# Patient Record
Sex: FEMALE | Race: WHITE | NOT HISPANIC OR LATINO | Employment: FULL TIME | URBAN - METROPOLITAN AREA
[De-identification: names, ages, dates, MRNs, and addresses within clinical notes are randomized per-mention and may not be internally consistent; named-entity substitution may affect disease eponyms.]

---

## 2023-07-15 LAB
CHLAMYDIA TRACH., AMPLIFIED: NEGATIVE
N. GONORRHEA, AMPLIFIED: NEGATIVE

## 2023-07-16 LAB — URINE CULTURE: NO GROWTH

## 2023-07-21 LAB
ERYTHROCYTE DISTRIBUTION WIDTH (RATIO) BY AUTOMATED COUNT: 14.7 % (ref 11.5–14.5)
ERYTHROCYTE MEAN CORPUSCULAR HEMOGLOBIN CONCENTRATION (G/DL) BY AUTOMATED: 32 G/DL (ref 32–36)
ERYTHROCYTE MEAN CORPUSCULAR VOLUME (FL) BY AUTOMATED COUNT: 89 FL (ref 80–100)
ERYTHROCYTES (10*6/UL) IN BLOOD BY AUTOMATED COUNT: 4.55 X10E12/L (ref 4–5.2)
HEMATOCRIT (%) IN BLOOD BY AUTOMATED COUNT: 40.6 % (ref 36–46)
HEMOGLOBIN (G/DL) IN BLOOD: 13 G/DL (ref 12–16)
LEUKOCYTES (10*3/UL) IN BLOOD BY AUTOMATED COUNT: 8.3 X10E9/L (ref 4.4–11.3)
PLATELETS (10*3/UL) IN BLOOD AUTOMATED COUNT: 265 X10E9/L (ref 150–450)
REFLEX ADDED, ANEMIA PANEL: ABNORMAL

## 2023-07-22 LAB
ABO GROUP (TYPE) IN BLOOD: NORMAL
ANTIBODY SCREEN: NORMAL
ESTIMATED AVERAGE GLUCOSE FOR HBA1C: 108 MG/DL
HEMOGLOBIN A1C/HEMOGLOBIN TOTAL IN BLOOD: 5.4 %
HEPATITIS B VIRUS SURFACE AG PRESENCE IN SERUM: NONREACTIVE
HEPATITIS C VIRUS AB PRESENCE IN SERUM: NONREACTIVE
HIV 1/ 2 AG/AB SCREEN: NONREACTIVE
RH FACTOR: NORMAL
RUBELLA VIRUS IGG AB: POSITIVE
SYPHILIS TOTAL AB: NONREACTIVE

## 2023-07-24 LAB
ABO GROUP (TYPE) IN BLOOD: NORMAL
ANTIBODY SCREEN: NORMAL
RH FACTOR: NORMAL

## 2023-09-02 PROBLEM — Z34.90 PRENATAL CARE (HHS-HCC): Status: ACTIVE | Noted: 2023-09-02

## 2023-09-02 RX ORDER — ACETAMINOPHEN 325 MG/1
3 TABLET ORAL EVERY 6 HOURS
COMMUNITY
Start: 2020-08-08 | End: 2023-10-10 | Stop reason: ALTCHOICE

## 2023-10-10 ENCOUNTER — ROUTINE PRENATAL (OUTPATIENT)
Dept: OBSTETRICS AND GYNECOLOGY | Facility: CLINIC | Age: 38
End: 2023-10-10
Payer: COMMERCIAL

## 2023-10-10 VITALS
SYSTOLIC BLOOD PRESSURE: 122 MMHG | DIASTOLIC BLOOD PRESSURE: 80 MMHG | BODY MASS INDEX: 30.16 KG/M2 | WEIGHT: 170.25 LBS

## 2023-10-10 DIAGNOSIS — Z3A.22 22 WEEKS GESTATION OF PREGNANCY (HHS-HCC): Primary | ICD-10-CM

## 2023-10-10 PROBLEM — O09.522 MULTIGRAVIDA OF ADVANCED MATERNAL AGE IN SECOND TRIMESTER (HHS-HCC): Status: ACTIVE | Noted: 2023-10-10

## 2023-10-10 PROBLEM — Z87.59 HISTORY OF SHOULDER DYSTOCIA IN PRIOR PREGNANCY: Status: ACTIVE | Noted: 2023-10-10

## 2023-10-10 PROCEDURE — 0501F PRENATAL FLOW SHEET: CPT | Performed by: OBSTETRICS & GYNECOLOGY

## 2023-10-10 PROCEDURE — 90686 IIV4 VACC NO PRSV 0.5 ML IM: CPT | Performed by: OBSTETRICS & GYNECOLOGY

## 2023-10-10 PROCEDURE — 90471 IMMUNIZATION ADMIN: CPT | Performed by: OBSTETRICS & GYNECOLOGY

## 2023-10-10 NOTE — PROGRESS NOTES
Routine ob at 22.3 wks.  Feeling well.  Normal anatomy scan, has phone number to schedule 30 wk scan.  Flu shot given today.  1 hour n/v.  H/o shoulder dystocia not discussed today, will need another MD visit for this in future.

## 2023-11-08 ENCOUNTER — ROUTINE PRENATAL (OUTPATIENT)
Dept: OBSTETRICS AND GYNECOLOGY | Facility: CLINIC | Age: 38
End: 2023-11-08
Payer: COMMERCIAL

## 2023-11-08 VITALS
DIASTOLIC BLOOD PRESSURE: 72 MMHG | WEIGHT: 174.13 LBS | BODY MASS INDEX: 30.84 KG/M2 | SYSTOLIC BLOOD PRESSURE: 114 MMHG

## 2023-11-08 DIAGNOSIS — Z13.1 SCREENING FOR DIABETES MELLITUS: Primary | ICD-10-CM

## 2023-11-08 DIAGNOSIS — Z3A.26 26 WEEKS GESTATION OF PREGNANCY (HHS-HCC): ICD-10-CM

## 2023-11-08 DIAGNOSIS — O09.522 MULTIGRAVIDA OF ADVANCED MATERNAL AGE IN SECOND TRIMESTER (HHS-HCC): ICD-10-CM

## 2023-11-08 LAB
ERYTHROCYTE [DISTWIDTH] IN BLOOD BY AUTOMATED COUNT: 13.2 % (ref 11.5–14.5)
GLUCOSE 1H P 50 G GLC PO SERPL-MCNC: 111 MG/DL
HCT VFR BLD AUTO: 35.1 % (ref 36–46)
HGB BLD-MCNC: 10.8 G/DL (ref 12–16)
MCH RBC QN AUTO: 27.1 PG (ref 26–34)
MCHC RBC AUTO-ENTMCNC: 30.8 G/DL (ref 32–36)
MCV RBC AUTO: 88 FL (ref 80–100)
NRBC BLD-RTO: 0 /100 WBCS (ref 0–0)
PLATELET # BLD AUTO: 195 X10*3/UL (ref 150–450)
RBC # BLD AUTO: 3.98 X10*6/UL (ref 4–5.2)
WBC # BLD AUTO: 10.3 X10*3/UL (ref 4.4–11.3)

## 2023-11-08 PROCEDURE — 36415 COLL VENOUS BLD VENIPUNCTURE: CPT

## 2023-11-08 PROCEDURE — 85027 COMPLETE CBC AUTOMATED: CPT

## 2023-11-08 PROCEDURE — 82728 ASSAY OF FERRITIN: CPT

## 2023-11-08 PROCEDURE — 82947 ASSAY GLUCOSE BLOOD QUANT: CPT

## 2023-11-08 PROCEDURE — 0501F PRENATAL FLOW SHEET: CPT | Performed by: OBSTETRICS & GYNECOLOGY

## 2023-11-08 PROCEDURE — 83550 IRON BINDING TEST: CPT

## 2023-11-08 PROCEDURE — 82607 VITAMIN B-12: CPT

## 2023-11-08 PROCEDURE — 82746 ASSAY OF FOLIC ACID SERUM: CPT

## 2023-11-09 LAB
FERRITIN SERPL-MCNC: 14 NG/ML
FOLATE SERPL-MCNC: 17.9 NG/ML
IRON SATN MFR SERPL: NORMAL %
IRON SERPL-MCNC: 35 UG/DL
REFLEX ADDED, ANEMIA PANEL: NORMAL
TIBC SERPL-MCNC: NORMAL UG/DL
UIBC SERPL-MCNC: >450 UG/DL
VIT B12 SERPL-MCNC: 279 PG/ML

## 2023-12-08 ENCOUNTER — ANCILLARY PROCEDURE (OUTPATIENT)
Dept: RADIOLOGY | Facility: CLINIC | Age: 38
End: 2023-12-08
Payer: COMMERCIAL

## 2023-12-08 DIAGNOSIS — Z34.90 ENCOUNTER FOR SUPERVISION OF NORMAL PREGNANCY, UNSPECIFIED, UNSPECIFIED TRIMESTER (HHS-HCC): ICD-10-CM

## 2023-12-08 PROCEDURE — 76816 OB US FOLLOW-UP PER FETUS: CPT

## 2023-12-08 PROCEDURE — 76819 FETAL BIOPHYS PROFIL W/O NST: CPT | Performed by: OBSTETRICS & GYNECOLOGY

## 2023-12-08 PROCEDURE — 76819 FETAL BIOPHYS PROFIL W/O NST: CPT

## 2023-12-08 PROCEDURE — 76816 OB US FOLLOW-UP PER FETUS: CPT | Performed by: OBSTETRICS & GYNECOLOGY

## 2023-12-11 ENCOUNTER — ROUTINE PRENATAL (OUTPATIENT)
Dept: OBSTETRICS AND GYNECOLOGY | Facility: CLINIC | Age: 38
End: 2023-12-11
Payer: COMMERCIAL

## 2023-12-11 VITALS — WEIGHT: 174.5 LBS | BODY MASS INDEX: 30.91 KG/M2 | SYSTOLIC BLOOD PRESSURE: 114 MMHG | DIASTOLIC BLOOD PRESSURE: 72 MMHG

## 2023-12-11 DIAGNOSIS — Z3A.31 31 WEEKS GESTATION OF PREGNANCY (HHS-HCC): Primary | ICD-10-CM

## 2023-12-11 PROCEDURE — 0501F PRENATAL FLOW SHEET: CPT | Performed by: OBSTETRICS & GYNECOLOGY

## 2023-12-11 PROCEDURE — 90471 IMMUNIZATION ADMIN: CPT | Performed by: OBSTETRICS & GYNECOLOGY

## 2023-12-11 PROCEDURE — 90715 TDAP VACCINE 7 YRS/> IM: CPT | Performed by: OBSTETRICS & GYNECOLOGY

## 2023-12-11 NOTE — PROGRESS NOTES
Routine ob at 31.2 wks.  Feeling well. Good FM.  Had normal growth scan last week, 60%, breech.  Has repeat scan at 37 wks.  Tdap today.  RTC 2 wks.

## 2024-01-05 ENCOUNTER — ROUTINE PRENATAL (OUTPATIENT)
Dept: OBSTETRICS AND GYNECOLOGY | Facility: CLINIC | Age: 39
End: 2024-01-05
Payer: COMMERCIAL

## 2024-01-05 VITALS
SYSTOLIC BLOOD PRESSURE: 112 MMHG | BODY MASS INDEX: 31.24 KG/M2 | DIASTOLIC BLOOD PRESSURE: 72 MMHG | WEIGHT: 176.38 LBS

## 2024-01-05 DIAGNOSIS — Z87.59 HISTORY OF SHOULDER DYSTOCIA IN PRIOR PREGNANCY: ICD-10-CM

## 2024-01-05 DIAGNOSIS — Z34.93 PRENATAL CARE IN THIRD TRIMESTER (HHS-HCC): Primary | ICD-10-CM

## 2024-01-05 DIAGNOSIS — O09.523 MULTIGRAVIDA OF ADVANCED MATERNAL AGE IN THIRD TRIMESTER (HHS-HCC): ICD-10-CM

## 2024-01-05 PROBLEM — Z3A.22 22 WEEKS GESTATION OF PREGNANCY (HHS-HCC): Status: RESOLVED | Noted: 2023-10-10 | Resolved: 2024-01-05

## 2024-01-05 PROCEDURE — 0501F PRENATAL FLOW SHEET: CPT | Performed by: OBSTETRICS & GYNECOLOGY

## 2024-01-05 NOTE — PROGRESS NOTES
Routine prenatal visit   No complaints today.  Lots of FM.  GBS next visit.  Start NSTs next visit.     Medical Problems       Problem List       Prenatal care    Overview Signed 10/10/2023 12:20 PM by Araceli Sandoval MD     -flu shot given 10/10         Multigravida of advanced maternal age in third trimester    Overview Signed 10/10/2023 12:19 PM by Araceli Sandoval MD     -serial growth  -NSTs at 36 wks  -IOL in 39th week         History of shoulder dystocia in prior pregnancy    Overview Signed 1/5/2024  5:04 PM by Belkys White MD     - second pregnancy   - no issues with delivery of 3rd child  - offered primary c/s and declines              Follow up in 2 week(s).

## 2024-01-19 ENCOUNTER — APPOINTMENT (OUTPATIENT)
Dept: OBSTETRICS AND GYNECOLOGY | Facility: CLINIC | Age: 39
End: 2024-01-19
Payer: COMMERCIAL

## 2024-01-22 ENCOUNTER — ROUTINE PRENATAL (OUTPATIENT)
Dept: OBSTETRICS AND GYNECOLOGY | Facility: CLINIC | Age: 39
End: 2024-01-22
Payer: COMMERCIAL

## 2024-01-22 VITALS — DIASTOLIC BLOOD PRESSURE: 82 MMHG | WEIGHT: 179 LBS | SYSTOLIC BLOOD PRESSURE: 112 MMHG | BODY MASS INDEX: 31.71 KG/M2

## 2024-01-22 DIAGNOSIS — O09.523 AMA (ADVANCED MATERNAL AGE) MULTIGRAVIDA 35+, THIRD TRIMESTER (HHS-HCC): ICD-10-CM

## 2024-01-22 DIAGNOSIS — O09.523 MULTIGRAVIDA OF ADVANCED MATERNAL AGE IN THIRD TRIMESTER (HHS-HCC): ICD-10-CM

## 2024-01-22 DIAGNOSIS — Z3A.37 37 WEEKS GESTATION OF PREGNANCY (HHS-HCC): ICD-10-CM

## 2024-01-22 PROCEDURE — 59025 FETAL NON-STRESS TEST: CPT | Performed by: OBSTETRICS & GYNECOLOGY

## 2024-01-22 PROCEDURE — 0501F PRENATAL FLOW SHEET: CPT | Performed by: OBSTETRICS & GYNECOLOGY

## 2024-01-22 PROCEDURE — 87081 CULTURE SCREEN ONLY: CPT

## 2024-01-22 NOTE — PROCEDURES
Winnie Dos Santos, a  at 37w2d with an BABAK of 2/10/2024, by Last Menstrual Period, was seen at [facility] for a nonstress test.    Non-Stress Test   Baseline Fetal Heart Rate for Non-Stress Test: 140 BPM  Variability in Waveform for Non-Stress Test: Moderate  Accelerations in Non-Stress Test: Yes  Decelerations in Non-Stress Test: None  Contractions in Non-Stress Test: Irregular  Interpretation of Non-Stress Test   Interpretation of Non-Stress Test: Reactive

## 2024-01-22 NOTE — PROGRESS NOTES
Routine ob at 37.2 wks.  Feeling well. Good FM.  GBS done.  SVE 1/long/high, vertex.  NST reactive.  Aware of recommendation for delivery by BABAK.  RTC 1 wk.

## 2024-01-25 ENCOUNTER — APPOINTMENT (OUTPATIENT)
Dept: OBSTETRICS AND GYNECOLOGY | Facility: CLINIC | Age: 39
End: 2024-01-25
Payer: COMMERCIAL

## 2024-01-25 LAB — GP B STREP GENITAL QL CULT: NORMAL

## 2024-01-26 ENCOUNTER — HOSPITAL ENCOUNTER (OUTPATIENT)
Dept: RADIOLOGY | Facility: CLINIC | Age: 39
Discharge: HOME | End: 2024-01-26
Payer: COMMERCIAL

## 2024-01-26 DIAGNOSIS — Z34.90 ENCOUNTER FOR SUPERVISION OF NORMAL PREGNANCY, UNSPECIFIED, UNSPECIFIED TRIMESTER (HHS-HCC): ICD-10-CM

## 2024-01-26 PROCEDURE — 76816 OB US FOLLOW-UP PER FETUS: CPT | Performed by: OBSTETRICS & GYNECOLOGY

## 2024-01-26 PROCEDURE — 76816 OB US FOLLOW-UP PER FETUS: CPT

## 2024-01-26 PROCEDURE — 76819 FETAL BIOPHYS PROFIL W/O NST: CPT

## 2024-01-26 PROCEDURE — 76819 FETAL BIOPHYS PROFIL W/O NST: CPT | Performed by: OBSTETRICS & GYNECOLOGY

## 2024-01-31 ENCOUNTER — ROUTINE PRENATAL (OUTPATIENT)
Dept: OBSTETRICS AND GYNECOLOGY | Facility: CLINIC | Age: 39
End: 2024-01-31
Payer: COMMERCIAL

## 2024-01-31 ENCOUNTER — PREP FOR PROCEDURE (OUTPATIENT)
Dept: OBSTETRICS AND GYNECOLOGY | Facility: CLINIC | Age: 39
End: 2024-01-31

## 2024-01-31 VITALS — WEIGHT: 177.8 LBS | SYSTOLIC BLOOD PRESSURE: 122 MMHG | DIASTOLIC BLOOD PRESSURE: 68 MMHG | BODY MASS INDEX: 31.5 KG/M2

## 2024-01-31 DIAGNOSIS — O09.523 MULTIGRAVIDA OF ADVANCED MATERNAL AGE IN THIRD TRIMESTER (HHS-HCC): ICD-10-CM

## 2024-01-31 DIAGNOSIS — Z3A.38 38 WEEKS GESTATION OF PREGNANCY (HHS-HCC): Primary | ICD-10-CM

## 2024-01-31 DIAGNOSIS — O48.0 POST-TERM PREGNANCY, 40-42 WEEKS OF GESTATION (HHS-HCC): Primary | ICD-10-CM

## 2024-01-31 PROCEDURE — 0501F PRENATAL FLOW SHEET: CPT | Performed by: OBSTETRICS & GYNECOLOGY

## 2024-01-31 PROCEDURE — 59025 FETAL NON-STRESS TEST: CPT | Performed by: OBSTETRICS & GYNECOLOGY

## 2024-01-31 NOTE — PROGRESS NOTES
Routine ob at 38.4 wks.  Feeling well.  Good FM.  NST reactive.  IOL scheduled for 8 am 2/8 for AMA and h/o shoulder dystocia.  RTC 1 wk.

## 2024-01-31 NOTE — PROCEDURES
Winnie Dos Santos, a  at 38w4d with an BABAK of 2/10/2024, by Last Menstrual Period, was seen at [facility] for a nonstress test.    Non-Stress Test   Baseline Fetal Heart Rate for Non-Stress Test: 140 BPM  Variability in Waveform for Non-Stress Test: Moderate  Accelerations in Non-Stress Test: Yes  Decelerations in Non-Stress Test: None  Contractions in Non-Stress Test: Not present  Interpretation of Non-Stress Test   Interpretation of Non-Stress Test: Reactive

## 2024-02-06 ENCOUNTER — ROUTINE PRENATAL (OUTPATIENT)
Dept: OBSTETRICS AND GYNECOLOGY | Facility: CLINIC | Age: 39
End: 2024-02-06
Payer: COMMERCIAL

## 2024-02-06 VITALS
DIASTOLIC BLOOD PRESSURE: 84 MMHG | SYSTOLIC BLOOD PRESSURE: 124 MMHG | BODY MASS INDEX: 31.73 KG/M2 | WEIGHT: 179.13 LBS

## 2024-02-06 DIAGNOSIS — Z3A.39 39 WEEKS GESTATION OF PREGNANCY (HHS-HCC): Primary | ICD-10-CM

## 2024-02-06 DIAGNOSIS — O09.523 MULTIGRAVIDA OF ADVANCED MATERNAL AGE IN THIRD TRIMESTER (HHS-HCC): ICD-10-CM

## 2024-02-06 PROCEDURE — 59025 FETAL NON-STRESS TEST: CPT | Performed by: OBSTETRICS & GYNECOLOGY

## 2024-02-06 PROCEDURE — 59426 ANTEPARTUM CARE ONLY: CPT | Performed by: OBSTETRICS & GYNECOLOGY

## 2024-02-06 NOTE — PROGRESS NOTES
Routine ob at 39.4 wks. Feeling well.  Good FM.  NST reactive.  SVE 1.5/50/-3, vertex, membranes stripped.  Has IOL Thursday at 39.6 wks.  RTC for postpartum visit.

## 2024-02-06 NOTE — PROCEDURES
Winnie Dos Santos, a  at 39w3d with an BABAK of 2/10/2024, by Last Menstrual Period, was seen at [facility] for a nonstress test.    Non-Stress Test   Baseline Fetal Heart Rate for Non-Stress Test: 140 BPM  Variability in Waveform for Non-Stress Test: Moderate  Accelerations in Non-Stress Test: Yes  Decelerations in Non-Stress Test: None  Contractions in Non-Stress Test: Not present  Interpretation of Non-Stress Test   Interpretation of Non-Stress Test: Reactive

## 2024-02-08 ENCOUNTER — ANESTHESIA EVENT (OUTPATIENT)
Dept: OBSTETRICS AND GYNECOLOGY | Facility: HOSPITAL | Age: 39
End: 2024-02-08
Payer: COMMERCIAL

## 2024-02-08 ENCOUNTER — HOSPITAL ENCOUNTER (INPATIENT)
Facility: HOSPITAL | Age: 39
LOS: 3 days | Discharge: HOME | End: 2024-02-11
Attending: OBSTETRICS & GYNECOLOGY | Admitting: OBSTETRICS & GYNECOLOGY
Payer: COMMERCIAL

## 2024-02-08 ENCOUNTER — ANESTHESIA (OUTPATIENT)
Dept: OBSTETRICS AND GYNECOLOGY | Facility: HOSPITAL | Age: 39
End: 2024-02-08
Payer: COMMERCIAL

## 2024-02-08 ENCOUNTER — APPOINTMENT (OUTPATIENT)
Dept: OBSTETRICS AND GYNECOLOGY | Facility: HOSPITAL | Age: 39
End: 2024-02-08
Payer: COMMERCIAL

## 2024-02-08 DIAGNOSIS — G89.18 POSTOPERATIVE PAIN: ICD-10-CM

## 2024-02-08 DIAGNOSIS — O48.0 POST-TERM PREGNANCY, 40-42 WEEKS OF GESTATION (HHS-HCC): ICD-10-CM

## 2024-02-08 DIAGNOSIS — K59.00 CONSTIPATION, UNSPECIFIED CONSTIPATION TYPE: ICD-10-CM

## 2024-02-08 DIAGNOSIS — R52 POSTPARTUM PAIN (HHS-HCC): Primary | ICD-10-CM

## 2024-02-08 DIAGNOSIS — O13.9 GESTATIONAL HYPERTENSION, ANTEPARTUM (HHS-HCC): ICD-10-CM

## 2024-02-08 PROBLEM — Z34.90 PRENATAL CARE (HHS-HCC): Status: RESOLVED | Noted: 2023-09-02 | Resolved: 2024-02-08

## 2024-02-08 PROBLEM — Z3A.39 39 WEEKS GESTATION OF PREGNANCY (HHS-HCC): Status: ACTIVE | Noted: 2024-02-08

## 2024-02-08 PROBLEM — D50.9 IRON DEFICIENCY ANEMIA: Status: ACTIVE | Noted: 2024-02-08

## 2024-02-08 LAB
ABO GROUP (TYPE) IN BLOOD: NORMAL
ANTIBODY SCREEN: NORMAL
ERYTHROCYTE [DISTWIDTH] IN BLOOD BY AUTOMATED COUNT: 16.8 % (ref 11.5–14.5)
HCT VFR BLD AUTO: 35.4 % (ref 36–46)
HGB BLD-MCNC: 11.4 G/DL (ref 12–16)
MCH RBC QN AUTO: 27.1 PG (ref 26–34)
MCHC RBC AUTO-ENTMCNC: 32.2 G/DL (ref 32–36)
MCV RBC AUTO: 84 FL (ref 80–100)
NRBC BLD-RTO: 0 /100 WBCS (ref 0–0)
PLATELET # BLD AUTO: 170 X10*3/UL (ref 150–450)
RBC # BLD AUTO: 4.2 X10*6/UL (ref 4–5.2)
RH FACTOR (ANTIGEN D): NORMAL
TREPONEMA PALLIDUM IGG+IGM AB [PRESENCE] IN SERUM OR PLASMA BY IMMUNOASSAY: NONREACTIVE
WBC # BLD AUTO: 10.7 X10*3/UL (ref 4.4–11.3)

## 2024-02-08 PROCEDURE — 86850 RBC ANTIBODY SCREEN: CPT | Performed by: OBSTETRICS & GYNECOLOGY

## 2024-02-08 PROCEDURE — 2500000001 HC RX 250 WO HCPCS SELF ADMINISTERED DRUGS (ALT 637 FOR MEDICARE OP): Performed by: OBSTETRICS & GYNECOLOGY

## 2024-02-08 PROCEDURE — 1120000001 HC OB PRIVATE ROOM DAILY

## 2024-02-08 PROCEDURE — 01968 ANES/ANALG CS DLVR NEURAXIAL: CPT | Performed by: NURSE ANESTHETIST, CERTIFIED REGISTERED

## 2024-02-08 PROCEDURE — 7210000002 HC LABOR PER HOUR

## 2024-02-08 PROCEDURE — 2500000004 HC RX 250 GENERAL PHARMACY W/ HCPCS (ALT 636 FOR OP/ED): Performed by: NURSE ANESTHETIST, CERTIFIED REGISTERED

## 2024-02-08 PROCEDURE — 2500000004 HC RX 250 GENERAL PHARMACY W/ HCPCS (ALT 636 FOR OP/ED): Performed by: OBSTETRICS & GYNECOLOGY

## 2024-02-08 PROCEDURE — 86920 COMPATIBILITY TEST SPIN: CPT

## 2024-02-08 PROCEDURE — 86780 TREPONEMA PALLIDUM: CPT | Mod: STJLAB | Performed by: OBSTETRICS & GYNECOLOGY

## 2024-02-08 PROCEDURE — 51702 INSERT TEMP BLADDER CATH: CPT

## 2024-02-08 PROCEDURE — 85027 COMPLETE CBC AUTOMATED: CPT | Performed by: OBSTETRICS & GYNECOLOGY

## 2024-02-08 PROCEDURE — 01967 NEURAXL LBR ANES VAG DLVR: CPT | Performed by: NURSE ANESTHETIST, CERTIFIED REGISTERED

## 2024-02-08 PROCEDURE — 36415 COLL VENOUS BLD VENIPUNCTURE: CPT | Performed by: OBSTETRICS & GYNECOLOGY

## 2024-02-08 RX ORDER — ONDANSETRON 4 MG/1
4 TABLET, FILM COATED ORAL EVERY 6 HOURS PRN
Status: DISCONTINUED | OUTPATIENT
Start: 2024-02-08 | End: 2024-02-09

## 2024-02-08 RX ORDER — NIFEDIPINE 10 MG/1
10 CAPSULE ORAL ONCE AS NEEDED
Status: DISCONTINUED | OUTPATIENT
Start: 2024-02-08 | End: 2024-02-09

## 2024-02-08 RX ORDER — HYDRALAZINE HYDROCHLORIDE 20 MG/ML
5 INJECTION INTRAMUSCULAR; INTRAVENOUS ONCE AS NEEDED
Status: DISCONTINUED | OUTPATIENT
Start: 2024-02-08 | End: 2024-02-09

## 2024-02-08 RX ORDER — ACETAMINOPHEN 325 MG/1
975 TABLET ORAL EVERY 6 HOURS PRN
Status: DISCONTINUED | OUTPATIENT
Start: 2024-02-08 | End: 2024-02-09

## 2024-02-08 RX ORDER — METOCLOPRAMIDE 10 MG/1
10 TABLET ORAL EVERY 6 HOURS PRN
Status: DISCONTINUED | OUTPATIENT
Start: 2024-02-08 | End: 2024-02-09

## 2024-02-08 RX ORDER — CARBOPROST TROMETHAMINE 250 UG/ML
250 INJECTION, SOLUTION INTRAMUSCULAR ONCE AS NEEDED
Status: DISCONTINUED | OUTPATIENT
Start: 2024-02-08 | End: 2024-02-09

## 2024-02-08 RX ORDER — SODIUM CHLORIDE, SODIUM LACTATE, POTASSIUM CHLORIDE, CALCIUM CHLORIDE 600; 310; 30; 20 MG/100ML; MG/100ML; MG/100ML; MG/100ML
125 INJECTION, SOLUTION INTRAVENOUS CONTINUOUS
Status: DISCONTINUED | OUTPATIENT
Start: 2024-02-08 | End: 2024-02-09

## 2024-02-08 RX ORDER — LIDOCAINE HYDROCHLORIDE 10 MG/ML
30 INJECTION INFILTRATION; PERINEURAL ONCE AS NEEDED
Status: DISCONTINUED | OUTPATIENT
Start: 2024-02-08 | End: 2024-02-09

## 2024-02-08 RX ORDER — OXYTOCIN 10 [USP'U]/ML
10 INJECTION, SOLUTION INTRAMUSCULAR; INTRAVENOUS ONCE AS NEEDED
Status: DISCONTINUED | OUTPATIENT
Start: 2024-02-08 | End: 2024-02-09

## 2024-02-08 RX ORDER — LABETALOL HYDROCHLORIDE 5 MG/ML
20 INJECTION, SOLUTION INTRAVENOUS ONCE AS NEEDED
Status: DISCONTINUED | OUTPATIENT
Start: 2024-02-08 | End: 2024-02-09

## 2024-02-08 RX ORDER — MISOPROSTOL 200 UG/1
800 TABLET ORAL ONCE AS NEEDED
Status: DISCONTINUED | OUTPATIENT
Start: 2024-02-08 | End: 2024-02-09

## 2024-02-08 RX ORDER — FENTANYL/ROPIVACAINE/NS/PF 2MCG/ML-.2
0-25 PLASTIC BAG, INJECTION (ML) INJECTION CONTINUOUS
Status: DISCONTINUED | OUTPATIENT
Start: 2024-02-08 | End: 2024-02-09

## 2024-02-08 RX ORDER — OXYTOCIN/0.9 % SODIUM CHLORIDE 30/500 ML
2-30 PLASTIC BAG, INJECTION (ML) INTRAVENOUS CONTINUOUS
Status: DISCONTINUED | OUTPATIENT
Start: 2024-02-08 | End: 2024-02-09

## 2024-02-08 RX ORDER — TRANEXAMIC ACID 100 MG/ML
1000 INJECTION, SOLUTION INTRAVENOUS ONCE AS NEEDED
Status: DISCONTINUED | OUTPATIENT
Start: 2024-02-08 | End: 2024-02-09

## 2024-02-08 RX ORDER — ONDANSETRON HYDROCHLORIDE 2 MG/ML
4 INJECTION, SOLUTION INTRAVENOUS EVERY 6 HOURS PRN
Status: DISCONTINUED | OUTPATIENT
Start: 2024-02-08 | End: 2024-02-09

## 2024-02-08 RX ORDER — METHYLERGONOVINE MALEATE 0.2 MG/ML
0.2 INJECTION INTRAVENOUS ONCE AS NEEDED
Status: DISCONTINUED | OUTPATIENT
Start: 2024-02-08 | End: 2024-02-09

## 2024-02-08 RX ORDER — OXYTOCIN/0.9 % SODIUM CHLORIDE 30/500 ML
60 PLASTIC BAG, INJECTION (ML) INTRAVENOUS
Status: DISCONTINUED | OUTPATIENT
Start: 2024-02-08 | End: 2024-02-09

## 2024-02-08 RX ORDER — TERBUTALINE SULFATE 1 MG/ML
0.25 INJECTION SUBCUTANEOUS ONCE AS NEEDED
Status: DISCONTINUED | OUTPATIENT
Start: 2024-02-08 | End: 2024-02-09

## 2024-02-08 RX ORDER — LOPERAMIDE HYDROCHLORIDE 2 MG/1
4 CAPSULE ORAL EVERY 2 HOUR PRN
Status: DISCONTINUED | OUTPATIENT
Start: 2024-02-08 | End: 2024-02-09

## 2024-02-08 RX ORDER — METOCLOPRAMIDE HYDROCHLORIDE 5 MG/ML
10 INJECTION INTRAMUSCULAR; INTRAVENOUS EVERY 6 HOURS PRN
Status: DISCONTINUED | OUTPATIENT
Start: 2024-02-08 | End: 2024-02-09

## 2024-02-08 RX ADMIN — Medication 2 MILLI-UNITS/MIN: at 14:25

## 2024-02-08 RX ADMIN — SODIUM CHLORIDE, POTASSIUM CHLORIDE, SODIUM LACTATE AND CALCIUM CHLORIDE 125 ML/HR: 600; 310; 30; 20 INJECTION, SOLUTION INTRAVENOUS at 18:39

## 2024-02-08 RX ADMIN — MISOPROSTOL 25 MCG: 100 TABLET ORAL at 09:43

## 2024-02-08 RX ADMIN — Medication 8 ML: at 15:48

## 2024-02-08 RX ADMIN — SODIUM CHLORIDE, POTASSIUM CHLORIDE, SODIUM LACTATE AND CALCIUM CHLORIDE 125 ML/HR: 600; 310; 30; 20 INJECTION, SOLUTION INTRAVENOUS at 14:25

## 2024-02-08 RX ADMIN — Medication 8 ML/HR: at 15:52

## 2024-02-08 SDOH — ECONOMIC STABILITY: HOUSING INSECURITY: DO YOU FEEL UNSAFE GOING BACK TO THE PLACE WHERE YOU ARE LIVING?: NO

## 2024-02-08 SDOH — HEALTH STABILITY: MENTAL HEALTH: HAVE YOU USED ANY PRESCRIPTION DRUGS OTHER THAN PRESCRIBED IN THE PAST 12 MONTHS?: NO

## 2024-02-08 SDOH — SOCIAL STABILITY: SOCIAL INSECURITY: DOES ANYONE TRY TO KEEP YOU FROM HAVING/CONTACTING OTHER FRIENDS OR DOING THINGS OUTSIDE YOUR HOME?: NO

## 2024-02-08 SDOH — HEALTH STABILITY: MENTAL HEALTH: CURRENT SMOKER: 0

## 2024-02-08 SDOH — SOCIAL STABILITY: SOCIAL INSECURITY: HAVE YOU HAD THOUGHTS OF HARMING ANYONE ELSE?: NO

## 2024-02-08 SDOH — SOCIAL STABILITY: SOCIAL INSECURITY: ARE YOU OR HAVE YOU BEEN THREATENED OR ABUSED PHYSICALLY, EMOTIONALLY, OR SEXUALLY BY ANYONE?: NO

## 2024-02-08 SDOH — HEALTH STABILITY: MENTAL HEALTH: HAVE YOU USED ANY SUBSTANCES (CANABIS, COCAINE, HEROIN, HALLUCINOGENS, INHALANTS, ETC.) IN THE PAST 12 MONTHS?: NO

## 2024-02-08 SDOH — SOCIAL STABILITY: SOCIAL INSECURITY: PHYSICAL ABUSE: DENIES

## 2024-02-08 SDOH — SOCIAL STABILITY: SOCIAL INSECURITY: ARE THERE ANY APPARENT SIGNS OF INJURIES/BEHAVIORS THAT COULD BE RELATED TO ABUSE/NEGLECT?: NO

## 2024-02-08 SDOH — SOCIAL STABILITY: SOCIAL INSECURITY: DO YOU FEEL ANYONE HAS EXPLOITED OR TAKEN ADVANTAGE OF YOU FINANCIALLY OR OF YOUR PERSONAL PROPERTY?: NO

## 2024-02-08 SDOH — HEALTH STABILITY: MENTAL HEALTH: WISH TO BE DEAD (PAST 1 MONTH): NO

## 2024-02-08 SDOH — HEALTH STABILITY: MENTAL HEALTH: SUICIDAL BEHAVIOR (LIFETIME): NO

## 2024-02-08 SDOH — HEALTH STABILITY: MENTAL HEALTH: NON-SPECIFIC ACTIVE SUICIDAL THOUGHTS (PAST 1 MONTH): NO

## 2024-02-08 SDOH — SOCIAL STABILITY: SOCIAL INSECURITY: VERBAL ABUSE: DENIES

## 2024-02-08 SDOH — SOCIAL STABILITY: SOCIAL INSECURITY: HAS ANYONE EVER THREATENED TO HURT YOUR FAMILY OR YOUR PETS?: NO

## 2024-02-08 SDOH — HEALTH STABILITY: MENTAL HEALTH: WERE YOU ABLE TO COMPLETE ALL THE BEHAVIORAL HEALTH SCREENINGS?: YES

## 2024-02-08 SDOH — SOCIAL STABILITY: SOCIAL INSECURITY: ABUSE SCREEN: ADULT

## 2024-02-08 ASSESSMENT — PAIN SCALES - GENERAL
PAINLEVEL_OUTOF10: 2
PAINLEVEL_OUTOF10: 2
PAINLEVEL_OUTOF10: 0 - NO PAIN
PAINLEVEL_OUTOF10: 3
PAINLEVEL_OUTOF10: 0 - NO PAIN
PAINLEVEL_OUTOF10: 2
PAINLEVEL_OUTOF10: 7
PAINLEVEL_OUTOF10: 0 - NO PAIN
PAINLEVEL_OUTOF10: 2
PAINLEVEL_OUTOF10: 3
PAINLEVEL_OUTOF10: 2
PAINLEVEL_OUTOF10: 0 - NO PAIN

## 2024-02-08 ASSESSMENT — ACTIVITIES OF DAILY LIVING (ADL): LACK_OF_TRANSPORTATION: NO

## 2024-02-08 ASSESSMENT — PATIENT HEALTH QUESTIONNAIRE - PHQ9
1. LITTLE INTEREST OR PLEASURE IN DOING THINGS: NOT AT ALL
SUM OF ALL RESPONSES TO PHQ9 QUESTIONS 1 & 2: 0
2. FEELING DOWN, DEPRESSED OR HOPELESS: NOT AT ALL

## 2024-02-08 ASSESSMENT — LIFESTYLE VARIABLES
HOW OFTEN DO YOU HAVE A DRINK CONTAINING ALCOHOL: NEVER
AUDIT-C TOTAL SCORE: 0
AUDIT-C TOTAL SCORE: 0
HOW MANY STANDARD DRINKS CONTAINING ALCOHOL DO YOU HAVE ON A TYPICAL DAY: PATIENT DOES NOT DRINK
SKIP TO QUESTIONS 9-10: 1
HOW OFTEN DO YOU HAVE 6 OR MORE DRINKS ON ONE OCCASION: NEVER

## 2024-02-08 NOTE — ANESTHESIA PREPROCEDURE EVALUATION
Patient: Winnie Dos Santos    Evaluation Method: In-person visit    Procedure Information    Date: 02/08/24  Procedure: Labor Consult         Relevant Problems   Anesthesia (within normal limits)      Cardiovascular (within normal limits)      Endocrine (within normal limits)      GI (within normal limits)      /Renal (within normal limits)      Neuro/Psych (within normal limits)      Pulmonary (within normal limits)      GI/Hepatic (within normal limits)      Hematology   (+) Iron deficiency anemia      Musculoskeletal (within normal limits)      Eyes, Ears, Nose, and Throat (within normal limits)      Infectious Disease (within normal limits)       Clinical information reviewed:   Tobacco  Allergies  Meds  Problems  Med Hx  Surg Hx   Fam Hx  Soc   Hx        NPO Detail:  No data recorded     OB/Gyn Evaluation    Present Pregnancy    Patient is pregnant now.   Obstetric History    (+) other significant obstetric history (Previous sholder distocia)              Physical Exam    Airway  Mallampati: I  TM distance: >3 FB  Neck ROM: full     Cardiovascular - normal exam  Rhythm: regular  Rate: normal     Dental - normal exam     Pulmonary - normal exam  Breath sounds clear to auscultation     Abdominal      Other findings: Gravid        Anesthesia Plan    History of general anesthesia?: yes  History of complications of general anesthesia?: no    ASA 2     epidural     The patient is not a current smoker.    Anesthetic plan and risks discussed with patient.  Use of blood products discussed with patient who consented to blood products.

## 2024-02-08 NOTE — NURSING NOTE
1530: Nina at bedside, POC discussed, pt requesting epidural at this time  1541: Mild range BP, provider notified, no new orders at this time  1550: pt laid back with epidural  1630: Nina at bedside, POC discussed, SVE performed, pt AROM for clear at 1635  1835: Nina at bedside, POC discussed, SVE performed

## 2024-02-08 NOTE — NURSING NOTE
Red folder education completed on admission. Patient and spouse verbalize understanding and state no questions at this time.     1245 - RN spoke with Dr. Wood regarding plan of care - will not do a 2nd cytotec, will start pitocin 4 hours after last dose of cytotec.     1312 - RN at bedside to adjust FHR monitor and maternal position change.     1345 - RN at bedside to adjust FHR monitor.     1500 - report given to Chelita Pacheco RN.

## 2024-02-08 NOTE — H&P
Obstetrical Admission History and Physical    Assessment/Plan    Winnie Dos Santos is a 38 y.o.  at 39w5d admitted for induction of labor.    #labor induction  risks and benefits discussed, all questions answered  Unable to place CRB, cytotec times one placed  Type and cross 1 unit prbc    #H/o shoulder dystocia, discussed recurrent risk of 15% with similar risk of nerve injury or rarely fetal hypoxia, discussed we cannot predict shoulder dystocia however this is her biggest baby. Pt desires vaginal delivery, was offered  delivery.    #GBS proph  GBS neg none needed    #AMA    #fetal well being  Category I fetal heart tracing        Medical Problems       Problem List       * (Principal) 39 weeks gestation of pregnancy    Multigravida of advanced maternal age in third trimester    Overview Signed 10/10/2023 12:19 PM by Araceli Sandoval MD     -serial growth  -NSTs at 36 wks  -IOL in 39th week         History of shoulder dystocia in prior pregnancy    Overview Signed 2024  5:04 PM by Belkys White MD     - second pregnancy   - no issues with delivery of 3rd child  - offered primary c/s and declines                Subjective   39 yo  at 39.5 weeks presents for induction of labor.  Pregnancy notable for:    AMA  H/o shoulder dystocia with second baby, EFW 3700 g extrapolated from ultrasound  AC 68% ile, EFW 52%ile    Obstetrical History   OB History    Para Term  AB Living   4 3 3     3   SAB IAB Ectopic Multiple Live Births           3      # Outcome Date GA Lbr Yannick/2nd Weight Sex Delivery Anes PTL Lv   4 Current            3 Term 20 39w3d  2.807 kg F Vag-Spont EPI  ELLY   2 Term 18 39w1d  3.26 kg F Vag-Spont None  ELLY      Complications: Shoulder Dystocia   1 Term 17 38w0d  2.977 kg M Vag-Spont EPI  ELLY       Past Medical History  Past Medical History:   Diagnosis Date    Personal history of other diseases of the female genital tract     History of  abnormal cervical Papanicolaou smear        Past Surgical History   Past Surgical History:   Procedure Laterality Date    COLPOSCOPY      OTHER SURGICAL HISTORY  01/14/2020    Inguinal hernia repair    WISDOM TOOTH EXTRACTION         Social History  Social History     Tobacco Use    Smoking status: Never    Smokeless tobacco: Never   Substance Use Topics    Alcohol use: Not Currently     Substance and Sexual Activity   Drug Use Never       Allergies  Patient has no known allergies.     Medications  Medications Prior to Admission   Medication Sig Dispense Refill Last Dose    iron,carb/vit C/vit B12/folic (IRON 100 PLUS ORAL) Take by mouth.   2/7/2024    PNV no.95/ferrous fum/folic ac (PRENATAL ORAL)    2/7/2024       Objective    Last Vitals  Temp Pulse Resp BP MAP O2 Sat   36.9 °C (98.4 °F) 100 (Simultaneous filing. User may not have seen previous data.) 16 121/73 (Simultaneous filing. User may not have seen previous data.)   96 % (Simultaneous filing. User may not have seen previous data.)     Physical Examination  General: no acute distress  HEENT: normocephalic, atraumatic  Heart: warm and well perfused  Lungs: breathing comfortably on room air  Abdomen: gravid  Extremities: moving all extremities  Neuro: awake and conversant  Psych: appropriate mood and affect    Cervical Exam  Dilation: 1  Effacement (%): 50  Fetal Station: -3  Method: Manual  OB Examiner: Dr. Wood  Fetal Assessment  Movement: Present  Mode: External US  Baseline Fetal Heart Rate (bpm): 140 bpm  Baseline Classification: Normal  Variability: Moderate (Between 6 and 25 BPM)  Pattern: Accelerations  FHR Category: Category I  Multiple Births: No     [place]: no ctx  sVE cl/50/-3 cephalic by BSUS    Lab Review  Lab Results   Component Value Date    WBC 10.7 02/08/2024    HGB 11.4 (L) 02/08/2024    HCT 35.4 (L) 02/08/2024     02/08/2024

## 2024-02-08 NOTE — PROGRESS NOTES
"Labor Progress Note    Subjective: comfortable with epidural    Objective:  Vitals: /59   Pulse 84   Temp 36.7 °C (98 °F) (Oral)   Resp 16   Ht 1.602 m (5' 3.07\")   Wt 81.5 kg (179 lb 12.6 oz)   LMP 05/06/2023 (Exact Date)   SpO2 (!) 93%   BMI 31.78 kg/m²      SVE 5/80/-3 AROM clear  Category I, baseline 130/mod variability/+accelerations/no decelerations  Ctx q 2-3 min    Continue pitocin per protocol  Epidural infusing      Negin Wood MD   OB/GYN    "

## 2024-02-08 NOTE — CARE PLAN
The patient's goals for the shift include healthy delivery    The clinical goals for the shift include FHR remains reassuring throughout my shift    Problem: Vaginal Birth or  Section  Goal: Fetal and maternal status remain reassuring during the birth process  Outcome: Progressing  Flowsheets (Taken 2024)  Fetal and maternal status remain reassuring during the birth process:   Monitor vital signs   Monitor fetal heart rate   Med administration/monitoring of effect   Monitor uterine activity   Monitor labor progression (Vaginal delivery)  Goal: Demonstrates labor coping techniques through delivery  Outcome: Progressing  Flowsheets (Taken 2024)  Demonstrates labor coping techniques through delivery:   Positioning, turning, and/or use of birthing ball assistance   Breathing/relaxation assistance     Problem: Safety - Adult  Goal: Free from fall injury  Outcome: Progressing  Flowsheets (Taken 2024)  Free from fall injury:   Instruct family/caregiver on patient safety   Based on caregiver fall risk screen, instruct family/caregiver to ask for assistance with transferring infant if caregiver noted to have fall risk factors

## 2024-02-08 NOTE — ANESTHESIA PROCEDURE NOTES
Epidural Block    Patient location during procedure: OB  Start time: 2/8/2024 3:25 PM  End time: 2/8/2024 3:56 PM  Reason for block: labor analgesia  Staffing  Performed: CRNA   Authorized by: BRENDAN Ramirez    Performed by: BRENDAN Ramirez    Preanesthetic Checklist  Completed: patient identified, IV checked, site marked, risks and benefits discussed, surgical consent, pre-op evaluation, timeout performed and sterile techniques followed  Block Timeout  RN/Licensed healthcare professional reads aloud to the Anesthesia provider and entire team: Patient identity, procedure with side and site, patient position, and as applicable the availability of implants/special equipment/special requirements.  Patient on coagulant treatment: no  Timeout performed at: 2/8/2024 3:30 PM  Block Placement  Patient position: sitting  Prep: ChloraPrep  Sterility prep: cap, gloves, drape, mask and hand  Sedation level: no sedation  Patient monitoring: blood pressure, continuous pulse oximetry and heart rate  Approach: midline  Local numbing: lidocaine 1% to skin and subcutaneous tissues  Vertebral space: lumbar  Lumbar location: L4-L5  Epidural  Loss of resistance technique: air  Guidance: landmark technique        Needle  Needle type: Tuohy   Needle gauge: 17  Needle length: 10.2 cm  Needle insertion depth: 6 cm  Catheter type: multi-orifice  Catheter size: 19 G  Catheter at skin depth: 11 cm  Catheter securement method: clear occlusive dressing    Test dose: lidocaine 1.5% with epinephrine 1-to-200,000  Test dose: lidocaine 1.5% with epinephrine 1-to-200,000  Test dose result: no positive test dose    PCEA  Medication concentration used: 0.2% Ropivacaine with 2 mcg/mL Fentanyl  Dose (mL): 5  Lockout (minutes): 20  1-Hour Limit (boluses/hr): 3  Basal Rate: 8        Assessment  Block outcome: pain improved  Number of attempts: 1  Events: no positive test dose  Procedure assessment: patient tolerated procedure  well with no immediate complications

## 2024-02-09 ENCOUNTER — APPOINTMENT (OUTPATIENT)
Dept: OBSTETRICS AND GYNECOLOGY | Facility: CLINIC | Age: 39
End: 2024-02-09
Payer: COMMERCIAL

## 2024-02-09 LAB
BASE EXCESS BLDCOA CALC-SCNC: -4.2 MMOL/L (ref -10.8–-0.5)
BASE EXCESS BLDCOV CALC-SCNC: -2.8 MMOL/L (ref -8.1–-0.5)
BODY TEMPERATURE: ABNORMAL
BODY TEMPERATURE: ABNORMAL
ERYTHROCYTE [DISTWIDTH] IN BLOOD BY AUTOMATED COUNT: 16.5 % (ref 11.5–14.5)
HCO3 BLDCOA-SCNC: 25.9 MMOL/L (ref 15–29)
HCO3 BLDCOV-SCNC: 25.1 MMOL/L (ref 16–26)
HCT VFR BLD AUTO: 31.3 % (ref 36–46)
HGB BLD-MCNC: 9.9 G/DL (ref 12–16)
INHALED O2 CONCENTRATION: 21 %
INHALED O2 CONCENTRATION: 21 %
MCH RBC QN AUTO: 27.1 PG (ref 26–34)
MCHC RBC AUTO-ENTMCNC: 31.6 G/DL (ref 32–36)
MCV RBC AUTO: 86 FL (ref 80–100)
NRBC BLD-RTO: 0 /100 WBCS (ref 0–0)
OXYHGB MFR BLDCOA: 6.2 % (ref 94–98)
OXYHGB MFR BLDCOV: 17.1 % (ref 94–98)
PCO2 BLDCOA: 71 MM HG (ref 31–75)
PCO2 BLDCOV: 56 MM HG (ref 22–53)
PH BLDCOA: 7.17 PH (ref 7.08–7.39)
PH BLDCOV: 7.26 PH (ref 7.19–7.47)
PLATELET # BLD AUTO: 150 X10*3/UL (ref 150–450)
PO2 BLDCOA: 16 MM HG (ref 5–31)
PO2 BLDCOV: 21 MM HG (ref 13–37)
RBC # BLD AUTO: 3.65 X10*6/UL (ref 4–5.2)
SAO2 % BLDCOA: 6 % (ref 3–69)
SAO2 % BLDCOV: 17 % (ref 16–84)
WBC # BLD AUTO: 16.6 X10*3/UL (ref 4.4–11.3)

## 2024-02-09 PROCEDURE — 7210000002 HC LABOR PER HOUR

## 2024-02-09 PROCEDURE — 59050 FETAL MONITOR W/REPORT: CPT

## 2024-02-09 PROCEDURE — 2500000004 HC RX 250 GENERAL PHARMACY W/ HCPCS (ALT 636 FOR OP/ED): Performed by: OBSTETRICS & GYNECOLOGY

## 2024-02-09 PROCEDURE — 10907ZC DRAINAGE OF AMNIOTIC FLUID, THERAPEUTIC FROM PRODUCTS OF CONCEPTION, VIA NATURAL OR ARTIFICIAL OPENING: ICD-10-PCS | Performed by: OBSTETRICS & GYNECOLOGY

## 2024-02-09 PROCEDURE — 2500000005 HC RX 250 GENERAL PHARMACY W/O HCPCS: Performed by: NURSE ANESTHETIST, CERTIFIED REGISTERED

## 2024-02-09 PROCEDURE — 85027 COMPLETE CBC AUTOMATED: CPT | Performed by: OBSTETRICS & GYNECOLOGY

## 2024-02-09 PROCEDURE — 3700000018 HC OB ANESTHESIA C-SECTION: Performed by: OBSTETRICS & GYNECOLOGY

## 2024-02-09 PROCEDURE — 82805 BLOOD GASES W/O2 SATURATION: CPT | Performed by: OBSTETRICS & GYNECOLOGY

## 2024-02-09 PROCEDURE — 3700000014 HC AN EPIDURAL BLOCK CHARGE: Performed by: OBSTETRICS & GYNECOLOGY

## 2024-02-09 PROCEDURE — 7100000016 HC LABOR RECOVERY PER HOUR: Performed by: OBSTETRICS & GYNECOLOGY

## 2024-02-09 PROCEDURE — 2720000007 HC OR 272 NO HCPCS: Performed by: OBSTETRICS & GYNECOLOGY

## 2024-02-09 PROCEDURE — 59515 CESAREAN DELIVERY: CPT | Performed by: OBSTETRICS & GYNECOLOGY

## 2024-02-09 PROCEDURE — 2500000001 HC RX 250 WO HCPCS SELF ADMINISTERED DRUGS (ALT 637 FOR MEDICARE OP): Performed by: NURSE ANESTHETIST, CERTIFIED REGISTERED

## 2024-02-09 PROCEDURE — 1120000001 HC OB PRIVATE ROOM DAILY

## 2024-02-09 PROCEDURE — 59514 CESAREAN DELIVERY ONLY: CPT | Performed by: OBSTETRICS & GYNECOLOGY

## 2024-02-09 PROCEDURE — 2500000004 HC RX 250 GENERAL PHARMACY W/ HCPCS (ALT 636 FOR OP/ED): Performed by: NURSE ANESTHETIST, CERTIFIED REGISTERED

## 2024-02-09 RX ORDER — MISOPROSTOL 200 UG/1
800 TABLET ORAL ONCE AS NEEDED
Status: DISCONTINUED | OUTPATIENT
Start: 2024-02-09 | End: 2024-02-09

## 2024-02-09 RX ORDER — FENTANYL CITRATE 50 UG/ML
INJECTION, SOLUTION INTRAMUSCULAR; INTRAVENOUS AS NEEDED
Status: DISCONTINUED | OUTPATIENT
Start: 2024-02-09 | End: 2024-02-09

## 2024-02-09 RX ORDER — HYDRALAZINE HYDROCHLORIDE 20 MG/ML
5 INJECTION INTRAMUSCULAR; INTRAVENOUS ONCE AS NEEDED
Status: DISCONTINUED | OUTPATIENT
Start: 2024-02-09 | End: 2024-02-09

## 2024-02-09 RX ORDER — METHYLERGONOVINE MALEATE 0.2 MG/ML
0.2 INJECTION INTRAVENOUS ONCE AS NEEDED
Status: DISCONTINUED | OUTPATIENT
Start: 2024-02-09 | End: 2024-02-09

## 2024-02-09 RX ORDER — ACETAMINOPHEN 120 MG/1
SUPPOSITORY RECTAL AS NEEDED
Status: DISCONTINUED | OUTPATIENT
Start: 2024-02-09 | End: 2024-02-09

## 2024-02-09 RX ORDER — CEFAZOLIN SODIUM 2 G/100ML
INJECTION, SOLUTION INTRAVENOUS AS NEEDED
Status: DISCONTINUED | OUTPATIENT
Start: 2024-02-08 | End: 2024-02-09

## 2024-02-09 RX ORDER — KETOROLAC TROMETHAMINE 30 MG/ML
INJECTION, SOLUTION INTRAMUSCULAR; INTRAVENOUS AS NEEDED
Status: DISCONTINUED | OUTPATIENT
Start: 2024-02-09 | End: 2024-02-09

## 2024-02-09 RX ORDER — NIFEDIPINE 10 MG/1
10 CAPSULE ORAL ONCE AS NEEDED
Status: DISCONTINUED | OUTPATIENT
Start: 2024-02-09 | End: 2024-02-09

## 2024-02-09 RX ORDER — DEXAMETHASONE SODIUM PHOSPHATE 4 MG/ML
INJECTION, SOLUTION INTRA-ARTICULAR; INTRALESIONAL; INTRAMUSCULAR; INTRAVENOUS; SOFT TISSUE AS NEEDED
Status: DISCONTINUED | OUTPATIENT
Start: 2024-02-09 | End: 2024-02-09

## 2024-02-09 RX ORDER — OXYTOCIN 10 [USP'U]/ML
10 INJECTION, SOLUTION INTRAMUSCULAR; INTRAVENOUS ONCE AS NEEDED
Status: DISCONTINUED | OUTPATIENT
Start: 2024-02-09 | End: 2024-02-09

## 2024-02-09 RX ORDER — LIDOCAINE 560 MG/1
1 PATCH PERCUTANEOUS; TOPICAL; TRANSDERMAL
Status: DISCONTINUED | OUTPATIENT
Start: 2024-02-09 | End: 2024-02-11 | Stop reason: HOSPADM

## 2024-02-09 RX ORDER — PHENYLEPHRINE HCL IN 0.9% NACL 1 MG/10 ML
SYRINGE (ML) INTRAVENOUS AS NEEDED
Status: DISCONTINUED | OUTPATIENT
Start: 2024-02-09 | End: 2024-02-09

## 2024-02-09 RX ORDER — OXYCODONE HYDROCHLORIDE 10 MG/1
10 TABLET ORAL EVERY 4 HOURS PRN
Status: DISCONTINUED | OUTPATIENT
Start: 2024-02-10 | End: 2024-02-11 | Stop reason: HOSPADM

## 2024-02-09 RX ORDER — NALOXONE HYDROCHLORIDE 0.4 MG/ML
0.1 INJECTION, SOLUTION INTRAMUSCULAR; INTRAVENOUS; SUBCUTANEOUS EVERY 5 MIN PRN
Status: DISCONTINUED | OUTPATIENT
Start: 2024-02-09 | End: 2024-02-11 | Stop reason: HOSPADM

## 2024-02-09 RX ORDER — KETOROLAC TROMETHAMINE 30 MG/ML
30 INJECTION, SOLUTION INTRAMUSCULAR; INTRAVENOUS EVERY 6 HOURS
Status: COMPLETED | OUTPATIENT
Start: 2024-02-09 | End: 2024-02-09

## 2024-02-09 RX ORDER — BUTORPHANOL TARTRATE 1 MG/ML
1 INJECTION INTRAMUSCULAR; INTRAVENOUS
Status: DISCONTINUED | OUTPATIENT
Start: 2024-02-09 | End: 2024-02-09

## 2024-02-09 RX ORDER — ONDANSETRON 4 MG/1
4 TABLET, FILM COATED ORAL EVERY 6 HOURS PRN
Status: DISCONTINUED | OUTPATIENT
Start: 2024-02-09 | End: 2024-02-09

## 2024-02-09 RX ORDER — LIDOCAINE HCL/EPINEPHRINE/PF 2%-1:200K
VIAL (ML) INJECTION AS NEEDED
Status: DISCONTINUED | OUTPATIENT
Start: 2024-02-09 | End: 2024-02-09

## 2024-02-09 RX ORDER — POLYETHYLENE GLYCOL 3350 17 G/17G
17 POWDER, FOR SOLUTION ORAL 2 TIMES DAILY PRN
Status: DISCONTINUED | OUTPATIENT
Start: 2024-02-09 | End: 2024-02-11 | Stop reason: HOSPADM

## 2024-02-09 RX ORDER — LOPERAMIDE HYDROCHLORIDE 2 MG/1
4 CAPSULE ORAL EVERY 2 HOUR PRN
Status: DISCONTINUED | OUTPATIENT
Start: 2024-02-09 | End: 2024-02-09

## 2024-02-09 RX ORDER — ADHESIVE BANDAGE
10 BANDAGE TOPICAL
Status: DISCONTINUED | OUTPATIENT
Start: 2024-02-09 | End: 2024-02-11 | Stop reason: HOSPADM

## 2024-02-09 RX ORDER — ONDANSETRON HYDROCHLORIDE 2 MG/ML
4 INJECTION, SOLUTION INTRAVENOUS EVERY 6 HOURS PRN
Status: DISCONTINUED | OUTPATIENT
Start: 2024-02-09 | End: 2024-02-09

## 2024-02-09 RX ORDER — SIMETHICONE 80 MG
80 TABLET,CHEWABLE ORAL 4 TIMES DAILY PRN
Status: DISCONTINUED | OUTPATIENT
Start: 2024-02-09 | End: 2024-02-11 | Stop reason: HOSPADM

## 2024-02-09 RX ORDER — TRANEXAMIC ACID 100 MG/ML
INJECTION, SOLUTION INTRAVENOUS AS NEEDED
Status: DISCONTINUED | OUTPATIENT
Start: 2024-02-09 | End: 2024-02-09

## 2024-02-09 RX ORDER — IBUPROFEN 600 MG/1
600 TABLET ORAL EVERY 6 HOURS
Status: DISCONTINUED | OUTPATIENT
Start: 2024-02-09 | End: 2024-02-11 | Stop reason: HOSPADM

## 2024-02-09 RX ORDER — FAMOTIDINE 10 MG/ML
INJECTION INTRAVENOUS AS NEEDED
Status: DISCONTINUED | OUTPATIENT
Start: 2024-02-09 | End: 2024-02-09

## 2024-02-09 RX ORDER — AZITHROMYCIN 100 MG/ML
INJECTION, POWDER, LYOPHILIZED, FOR SOLUTION INTRAVENOUS AS NEEDED
Status: DISCONTINUED | OUTPATIENT
Start: 2024-02-09 | End: 2024-02-09

## 2024-02-09 RX ORDER — ACETAMINOPHEN 325 MG/1
975 TABLET ORAL EVERY 6 HOURS
Status: DISCONTINUED | OUTPATIENT
Start: 2024-02-09 | End: 2024-02-11 | Stop reason: HOSPADM

## 2024-02-09 RX ORDER — INDOMETHACIN 25 MG/1
CAPSULE ORAL AS NEEDED
Status: DISCONTINUED | OUTPATIENT
Start: 2024-02-09 | End: 2024-02-09

## 2024-02-09 RX ORDER — DIPHENHYDRAMINE HCL 25 MG
25 TABLET ORAL EVERY 4 HOURS PRN
Status: DISCONTINUED | OUTPATIENT
Start: 2024-02-09 | End: 2024-02-11 | Stop reason: HOSPADM

## 2024-02-09 RX ORDER — LIDOCAINE HCL/EPINEPHRINE/PF 2%-1:200K
VIAL (ML) INJECTION
Status: COMPLETED
Start: 2024-02-09 | End: 2024-02-09

## 2024-02-09 RX ORDER — LABETALOL HYDROCHLORIDE 5 MG/ML
20 INJECTION, SOLUTION INTRAVENOUS ONCE AS NEEDED
Status: DISCONTINUED | OUTPATIENT
Start: 2024-02-09 | End: 2024-02-09

## 2024-02-09 RX ORDER — OXYCODONE HYDROCHLORIDE 5 MG/1
5 TABLET ORAL EVERY 4 HOURS PRN
Status: DISCONTINUED | OUTPATIENT
Start: 2024-02-10 | End: 2024-02-11 | Stop reason: HOSPADM

## 2024-02-09 RX ORDER — TRANEXAMIC ACID 100 MG/ML
1000 INJECTION, SOLUTION INTRAVENOUS ONCE AS NEEDED
Status: DISCONTINUED | OUTPATIENT
Start: 2024-02-09 | End: 2024-02-09

## 2024-02-09 RX ORDER — ENOXAPARIN SODIUM 100 MG/ML
40 INJECTION SUBCUTANEOUS EVERY 24 HOURS
Status: DISCONTINUED | OUTPATIENT
Start: 2024-02-09 | End: 2024-02-11 | Stop reason: HOSPADM

## 2024-02-09 RX ORDER — DIPHENHYDRAMINE HYDROCHLORIDE 50 MG/ML
25 INJECTION INTRAMUSCULAR; INTRAVENOUS EVERY 4 HOURS PRN
Status: DISCONTINUED | OUTPATIENT
Start: 2024-02-09 | End: 2024-02-11 | Stop reason: HOSPADM

## 2024-02-09 RX ORDER — HYDROMORPHONE HYDROCHLORIDE 1 MG/ML
0.2 INJECTION, SOLUTION INTRAMUSCULAR; INTRAVENOUS; SUBCUTANEOUS EVERY 5 MIN PRN
Status: DISCONTINUED | OUTPATIENT
Start: 2024-02-09 | End: 2024-02-11 | Stop reason: HOSPADM

## 2024-02-09 RX ORDER — OXYTOCIN/0.9 % SODIUM CHLORIDE 30/500 ML
60 PLASTIC BAG, INJECTION (ML) INTRAVENOUS ONCE AS NEEDED
Status: DISCONTINUED | OUTPATIENT
Start: 2024-02-09 | End: 2024-02-11 | Stop reason: HOSPADM

## 2024-02-09 RX ORDER — MORPHINE SULFATE 1 MG/ML
INJECTION, SOLUTION EPIDURAL; INTRATHECAL; INTRAVENOUS AS NEEDED
Status: DISCONTINUED | OUTPATIENT
Start: 2024-02-09 | End: 2024-02-09

## 2024-02-09 RX ORDER — BISACODYL 10 MG/1
10 SUPPOSITORY RECTAL DAILY PRN
Status: DISCONTINUED | OUTPATIENT
Start: 2024-02-09 | End: 2024-02-11 | Stop reason: HOSPADM

## 2024-02-09 RX ORDER — CARBOPROST TROMETHAMINE 250 UG/ML
250 INJECTION, SOLUTION INTRAMUSCULAR ONCE AS NEEDED
Status: DISCONTINUED | OUTPATIENT
Start: 2024-02-09 | End: 2024-02-09

## 2024-02-09 RX ADMIN — Medication 600 MILLI-UNITS/MIN: at 01:48

## 2024-02-09 RX ADMIN — ACETAMINOPHEN 325 MG: 325 TABLET ORAL at 15:31

## 2024-02-09 RX ADMIN — SODIUM BICARBONATE 1 MEQ: 84 INJECTION, SOLUTION INTRAVENOUS at 01:26

## 2024-02-09 RX ADMIN — ACETAMINOPHEN 975 MG: 325 TABLET ORAL at 21:19

## 2024-02-09 RX ADMIN — KETOROLAC TROMETHAMINE 30 MG: 30 INJECTION, SOLUTION INTRAMUSCULAR; INTRAVENOUS at 15:31

## 2024-02-09 RX ADMIN — ACETAMINOPHEN 975 MG: 325 TABLET ORAL at 09:37

## 2024-02-09 RX ADMIN — Medication 100 MCG: at 01:56

## 2024-02-09 RX ADMIN — AZITHROMYCIN MONOHYDRATE 500 MG: 500 INJECTION, POWDER, LYOPHILIZED, FOR SOLUTION INTRAVENOUS at 01:44

## 2024-02-09 RX ADMIN — FENTANYL CITRATE 100 MCG: 50 INJECTION, SOLUTION INTRAMUSCULAR; INTRAVENOUS at 00:41

## 2024-02-09 RX ADMIN — FAMOTIDINE 20 MG: 10 INJECTION INTRAVENOUS at 01:25

## 2024-02-09 RX ADMIN — TRANEXAMIC ACID 1000 MG: 1 INJECTION, SOLUTION INTRAVENOUS at 01:53

## 2024-02-09 RX ADMIN — ENOXAPARIN SODIUM 40 MG: 40 INJECTION SUBCUTANEOUS at 18:12

## 2024-02-09 RX ADMIN — HYDROMORPHONE HYDROCHLORIDE 0.2 MG: 1 INJECTION, SOLUTION INTRAMUSCULAR; INTRAVENOUS; SUBCUTANEOUS at 05:00

## 2024-02-09 RX ADMIN — KETOROLAC TROMETHAMINE 30 MG: 30 INJECTION, SOLUTION INTRAMUSCULAR; INTRAVENOUS at 21:19

## 2024-02-09 RX ADMIN — DEXAMETHASONE SODIUM PHOSPHATE 4 MG: 4 INJECTION, SOLUTION INTRAMUSCULAR; INTRAVENOUS at 01:50

## 2024-02-09 RX ADMIN — CEFAZOLIN SODIUM 2 G: 2 INJECTION, SOLUTION INTRAVENOUS at 01:38

## 2024-02-09 RX ADMIN — KETOROLAC TROMETHAMINE 30 MG: 30 INJECTION, SOLUTION INTRAMUSCULAR; INTRAVENOUS at 09:38

## 2024-02-09 RX ADMIN — ONDANSETRON 4 MG: 2 INJECTION INTRAMUSCULAR; INTRAVENOUS at 01:44

## 2024-02-09 RX ADMIN — LIDOCAINE HYDROCHLORIDE,EPINEPHRINE BITARTRATE 5 ML: 20; .005 INJECTION, SOLUTION EPIDURAL; INFILTRATION; INTRACAUDAL; PERINEURAL at 01:35

## 2024-02-09 RX ADMIN — KETOROLAC TROMETHAMINE 30 MG: 30 INJECTION, SOLUTION INTRAMUSCULAR; INTRAVENOUS at 02:10

## 2024-02-09 RX ADMIN — MORPHINE SULFATE 1.5 MG: 1 INJECTION, SOLUTION EPIDURAL; INTRATHECAL; INTRAVENOUS at 01:56

## 2024-02-09 RX ADMIN — ACETAMINOPHEN 650 MG: 120 SUPPOSITORY RECTAL at 02:21

## 2024-02-09 RX ADMIN — LIDOCAINE HYDROCHLORIDE,EPINEPHRINE BITARTRATE 5 ML: 20; .005 INJECTION, SOLUTION EPIDURAL; INFILTRATION; INTRACAUDAL; PERINEURAL at 01:26

## 2024-02-09 RX ADMIN — LIDOCAINE HYDROCHLORIDE,EPINEPHRINE BITARTRATE 5 ML: 20; .005 INJECTION, SOLUTION EPIDURAL; INFILTRATION; INTRACAUDAL; PERINEURAL at 01:30

## 2024-02-09 ASSESSMENT — PAIN SCALES - GENERAL
PAINLEVEL_OUTOF10: 0 - NO PAIN
PAINLEVEL_OUTOF10: 6
PAINLEVEL_OUTOF10: 0 - NO PAIN
PAINLEVEL_OUTOF10: 6
PAINLEVEL_OUTOF10: 0 - NO PAIN
PAINLEVEL_OUTOF10: 3
PAINLEVEL_OUTOF10: 0 - NO PAIN
PAIN_LEVEL: 0
PAINLEVEL_OUTOF10: 4
PAINLEVEL_OUTOF10: 0 - NO PAIN
PAINLEVEL_OUTOF10: 0 - NO PAIN

## 2024-02-09 ASSESSMENT — PAIN SCALES - PAIN ASSESSMENT IN ADVANCED DEMENTIA (PAINAD)
TOTALSCORE: 0
BREATHING: NORMAL
BODYLANGUAGE: RELAXED
CONSOLABILITY: NO NEED TO CONSOLE
FACIALEXPRESSION: SMILING OR INEXPRESSIVE

## 2024-02-09 ASSESSMENT — PAIN DESCRIPTION - LOCATION: LOCATION: ABDOMEN

## 2024-02-09 ASSESSMENT — PAIN - FUNCTIONAL ASSESSMENT: PAIN_FUNCTIONAL_ASSESSMENT: 0-10

## 2024-02-09 NOTE — L&D DELIVERY NOTE
OB Delivery Note  2024  Winnie SENDY Dos Santos  38 y.o.   , Low Transverse        Gestational Age: 39w6d  /Para:   Quantitative Blood Loss: Admission to Discharge: 0 mL (2024  8:16 AM - 2024  2:51 AM)    Levon Raji [[id number]]      Labor Events    Rupture date/time: 2024 1635  Rupture type: Artificial  Fluid color: Clear  Fluid odor: None  Labor type: Induced Onset of Labor  Labor allowed to proceed with plans for an attempted vaginal birth?: Yes  Induction: Riggs/EASI  Induction indications: Other  Complications: Fetal Intolerance, Failure to Progress in Second Stage       Placenta    Placenta delivery date/time:   Placenta removal: Spontaneous  Placenta appearance: Intact  Placenta disposition: discarded       Cord    Vessels: 3 vessels  Complications: Nuchal  Nuchal intervention: reduced  Nuchal cord description: loose nuchal cord  Number of loops: 1  Delayed cord clamping?: No  Cord blood disposition: Lab  Gases sent?: Yes  Stem cell collection (by provider): No       Lacerations    Episiotomy: None  Perineal laceration: None  Other lacerations?: No  Repair suture: None       Anesthesia    Method: Epidural       Operative Delivery    Forceps attempted?: No  Vacuum extractor attempted?: No       Shoulder Dystocia    Shoulder dystocia present?: No        Delivery    Birth date/time: 2026 01:48:00  Delivery type: , Low Transverse   categorization: primary   priority: routine  Indications for : Persistent Category 2 Tracing Remote from Delivery  Incision type: low transverse  Complications: Fetal Intolerance, Failure to Progress in Second Stage       Apgars    Living status:   Apgar Component Scores:  1 min.:  5 min.:  10 min.:  15 min.:  20 min.:    Skin color:         Heart rate:         Reflex irritability:         Muscle tone:         Respiratory effort:         Total:                Delivery Providers    Delivering clinician:  Armin Estrada MD   Provider Role     Delivery Nurse     Nursery Nurse     Resident                 Armin Estrada MD    Reason for : Persistent Cat 2 Remote From Delivery     Category: Non-scheduled, non-urgent  Additional Procedures:  None  Anesthesia: epidural  Specimen: None    Attending: Armin Estrada MD  Assistants:     Informed Consent:  The risks, benefits, complications, and alternatives were discussed with the patient. The patient understood that the risks of  section include, but are not limited to: injury to nearby structures or organs, infection, blood loss and possible need for transfusion, and potential need for more surgery including hysterectomy. The patient stated understanding and desired to proceed. All questions were answered. The site of surgery was properly noted and marked. The patient was identified as Winnie Dos Santos and the procedure verified as a  delivery. A Time Out was held and the above information confirmed.    Findings: Normal appearing gravid uterus, fallopian tubes, and ovaries. No significant adhesive disease.     Procedure Details:  The patient was taken back to the OR where she was prepped and draped in the usual sterile fashion in supine position with a left lateral tilt. SCDs were placed for DVT prophylaxis. A barnes catheter was placed and a vaginal prep performed. Preoperative antibiotics were administered.     A Pfannestiel incision was made. We dissected down to the level of the fascia using a combination of sharp dissection and electrocautery dissection. The fascia was incised in the midline and extended laterally sharply. The superior aspect of the fascia was then dissected off the underlying rectus muscles. The rectus muscles were divided bluntly in the midline. We entered the peritoneum bluntly and stretched to expose the uterus. A bladder blade was inserted.    A low transverse uterine incision was created using scalpel. We extended  the hysterostomy via gentle cephalocaudad stretch. The baby was delivered atraumatically from the LOT position. Delayed cord clamping was not performed as baby was visibly having apnea and was handed off to the waiting nurse to take to the warmer to begin the [place] Pink, peds and respiratory were already present. Cord blood was collected. The placenta was expressed, and the uterine cavity cleared of debris.  The uterus was exteriorized.    The hysterotomy was closed in two layers using O-Monocryl the first a running locked suture, the second an imbricating suture. The hysterotomy was inspected and hemostatic. The uterus was placed back into the abdominal cavity. The gutters were cleared of clot and debris. The pelvis was irrigated. The bladder, peritoneum, and muscle were inspected and noted to be hemostatic and free of injury. The peritoneum was closed with 2-0 Vicryl. The fascia was closed in a single running layer with 0-Vicryl suture. The subcutaneous layer was irrigated, and electrocautery used to obtain hemostasis. The skin was closed in a running subcuticular suture using 4-O Monocryl A sterile dressing was placed. Rectal Tylenol was given.     The patient tolerated the procedure well. All sponge, lap, and needle counts were correct x 2. The patient was brought back to her room in stable condition.

## 2024-02-09 NOTE — PROGRESS NOTES
Decision for     Patient with persistent Cat. 2 tracing. Patient has pushed effectively but there has been no movement from the -1 position. The vertex comes down to the level of the pubic bone then slides right back up.     Patient had been counseled on the risks of shoulder dystocia prior, patient with a dystocia with a 7# 0 oz baby - other two  were smaller - this baby EFW was 8# 0 oz. Patient was given the option of continuing to push vs. opting at this point for a  based on a persistent Cat. 2 tracing, likely remote from delivery, and shoulder dystocia.    Patient was counseled about risks of  including pain, bleeding, infection, damage to internal organs including bowel and bladder, nerves and ureter, possible injury to baby.  Patient after discussing with her  elects to proceed with a . Pt agrees to proceed with .  Consents to blood transfusion if needed.

## 2024-02-09 NOTE — LACTATION NOTE
Lactation Consultant Note  Lactation Consultation  Reason for Consult: Initial assessment  Consultant Name: Neida Best    Maternal Information  Has mother  before?: Yes  How long did the mother previously breastfeed?: 3 older children, all exclusive for 4-6 months, then breastmilk/formula until 10 months  Infant to breast within first 2 hours of birth?: Yes  Exclusive Pump and Bottle Feed: No    Maternal Assessment  Breast Assessment: Large, Pendulous, Soft  Nipple Assessment: Intact, Large diameter, Erect  Areola Assessment: Normal    Infant Assessment  Infant Behavior: Sleepy, Rooting response, Suckles on and off, needs stimulation  Infant Assessment: Tongue protrudes over alveolar ridge, Able to elevate tongue to roof of mouth    Feeding Assessment  Nutrition Source: Breastmilk  Feeding Method: Nursing at the breast  Feeding Position: Cradle, Mother demonstrates good positioning, Breast sandwich  Suck/Feeding: Sustained, Baby led rhythmically, Audible swallowing, Tactile stimulation needed  Latch Assessment: Latch achieved after repeated attempts, Wide open mouth < 160, Bursts of sucking, swallowing, and rest, Flanged lips    LATCH TOOL  Latch: Repeated attempts, hold nipple in mouth, stimulate to suck  Audible Swallowing: Spontaneous and intermittent (24 hours old)  Type of Nipple: Everted (After stimulation)  Comfort (Breast/Nipple): Soft/non-tender  Hold (Positioning): No assist from staff, mother able to position/hold infant  LATCH Score: 9    Breast Pump       Other OB Lactation Tools       Patient Follow-up  Inpatient Lactation Follow-up Needed : No  Outpatient Lactation Follow-up: Recommended  Lactation Professional - OK to Discharge: Yes    Other OB Lactation Documentation  Maternal Risk Factors: Other (comment),  delivery (Advanced Maternal Age)  Infant Risk Factors: Infant Apgar <8 (APGAR 2, 5 and 8)    Recommendations/Summary  , 39.5 weeks, Pc/s on @0148. Birthweight 3550g. TCB  4.6@5 hours. Mother experienced. Reports this infant has latched well. Called to bedside because >4 hours since last feeding, infant sleepy and not interested in breastfeeding. Reviewed gentle waking, demonstrated to parents. Infant roused after 3 minutes, awake, rooting. Sleepy initially at breast, mother demonstrates good deep latching technique. Infant latched with stimulation, sucking with tactile stimulation. Wide gape, flanged lips, bursts of sucking, swallowing, and rest.     Educated parents on skin to skin, hand expression, hunger cues and feeding frequency/patterns. Discussed expected output, weight loss <10%, and normal bilirubin. Educated on AAP pacifier recommendations. Reviewed outpatient resources.

## 2024-02-09 NOTE — ANESTHESIA POSTPROCEDURE EVALUATION
"Patient: Winnie Dos Santos    Procedure Summary       Date: 24 Room / Location: [facility] OB    Anesthesia Start: 1525 Anesthesia Stop: 24    Procedure:  Section Diagnosis: (Arrest of descent / materanl descion)    Surgeons: Armin Estrada MD Responsible Provider: BRENDAN Ramirez    Anesthesia Type: epidural ASA Status: 2            Anesthesia Type: epidural     /66   Pulse 89   Temp 36.9 °C (98.4 °F) (Oral)   Resp 16   Ht 1.602 m (5' 3.07\")   Wt 81.5 kg (179 lb 12.6 oz)   LMP 2023 (Exact Date)   SpO2 96%   BMI 31.78 kg/m²        Anesthesia Post Evaluation    Patient location during evaluation: bedside  Patient participation: complete - patient participated  Level of consciousness: awake and alert  Pain score: 0  Pain management: adequate  Multimodal analgesia pain management approach  Airway patency: patent  Cardiovascular status: acceptable  Respiratory status: acceptable  Hydration status: acceptable  Postoperative Nausea and Vomiting: none      There were no known notable events for this encounter.    "

## 2024-02-09 NOTE — PROGRESS NOTES
Spiritual Care Visit    Clinical Encounter Type  Visited With: Patient and family together  Routine Visit: Introduction  Continue Visiting: No                                            Taxonomy  Intended Effects: Promote sense of peace, Preserve dignity and respect, Helping someone feel comforted, Debra affirmation  Methods: Offer spiritual/Uatsdin support  Interventions: Deposit, Provide a Uatsdin item(s), Share words of hope and inspiration    Patient shared this is her 4th baby.   congratulated her and was a supportive presence.   prayed at her request and gave her a baby Bible.

## 2024-02-09 NOTE — CARE PLAN
The patient's goals for the shift include stabilize blood pressure    The clinical goals for the shift include stabilize blood pressure    Over the shift, the patient did make progress toward the following goals.

## 2024-02-09 NOTE — NURSING NOTE
Pt was taken to OR at 0052 and pt arrived in OR at 0053, but patient pushed from 0059 to 0125, then decision for c section was made by provider and patient at this time. Skin incision was at 0142.

## 2024-02-09 NOTE — CARE PLAN
The patient's goals for the shift include Healthy delivery    The clinical goals for the shift include FHR remains reassuring throughout induction    Over the shift, the patient did not make progress toward the following goals. Barriers to progression include none. Recommendations to address these barriers include n/a.    Problem: Vaginal Birth or  Section  Goal: Fetal and maternal status remain reassuring during the birth process  Outcome: Met  Goal: Demonstrates labor coping techniques through delivery  Outcome: Met     Problem: Safety - Adult  Goal: Free from fall injury  2024 by Fay Pabon RN  Outcome: Progressing  Flowsheets (Taken 2024 by Chelita Quintanilla, RN)  Free from fall injury:   Instruct family/caregiver on patient safety   Based on caregiver fall risk screen, instruct family/caregiver to ask for assistance with transferring infant if caregiver noted to have fall risk factors  2024 by Fay Pabon RN  Outcome: Progressing     Problem: Postpartum  Goal: Experiences normal postpartum course  Outcome: Progressing  Flowsheets (Taken 2024)  Experiences normal postpartum course:   Monitor maternal vital signs   Assess uterine involution   Med administration/monitoring of effect   Fundal and lochia asssessments w/fundal massage, bladder emptying   Assist with identification of coping methods and supprot resources  Goal: Incisions, wounds, or drain sites healing without S/S of infection  Outcome: Progressing  Flowsheets (Taken 2024)  Incisions, wounds, or drain sites healing without sign and symptoms of infection: TWICE DAILY: Assess and document dressing/incision, wound bed, drain sites and surrounding tissue  Goal: No s/sx of hemorrhage  Outcome: Progressing  Flowsheets (Taken 2024)  No s/sx of hemorrhage:   Monitor QBL and vital signs   Fundal + lochia assessments w/fundal massage, bladder emptying, intrauterine device when indicated      Problem:  Recovery Care  Goal: Manages discomfort  Outcome: Progressing  Flowsheets (Taken 2024)  Manages discomfort:   Assess and monitor for signs and symptoms of discomfort   Assess patient's pain level regularly and per hospital policy   Administer medications as ordered   Support use of nonpharmacological methods to help control pain such as distraction, imagery, relaxation, and application of heat and cold   Collaborate with interdisciplinary team and patient to determine appropriate pain management plan  Goal: Dressing intact until removed with any drainage marked  Outcome: Progressing  Flowsheets (Taken 2024)  Dressing intact during recovery with any drainage marked:   During recovery check dressing   Haseeb any drainage on dressing

## 2024-02-10 PROCEDURE — 2500000001 HC RX 250 WO HCPCS SELF ADMINISTERED DRUGS (ALT 637 FOR MEDICARE OP): Performed by: OBSTETRICS & GYNECOLOGY

## 2024-02-10 PROCEDURE — 2500000004 HC RX 250 GENERAL PHARMACY W/ HCPCS (ALT 636 FOR OP/ED): Performed by: OBSTETRICS & GYNECOLOGY

## 2024-02-10 PROCEDURE — 1120000001 HC OB PRIVATE ROOM DAILY

## 2024-02-10 RX ADMIN — IBUPROFEN 600 MG: 600 TABLET ORAL at 05:51

## 2024-02-10 RX ADMIN — IBUPROFEN 600 MG: 600 TABLET ORAL at 11:48

## 2024-02-10 RX ADMIN — IBUPROFEN 600 MG: 600 TABLET ORAL at 17:51

## 2024-02-10 RX ADMIN — ACETAMINOPHEN 975 MG: 325 TABLET ORAL at 11:48

## 2024-02-10 RX ADMIN — ACETAMINOPHEN 975 MG: 325 TABLET ORAL at 17:51

## 2024-02-10 RX ADMIN — ACETAMINOPHEN 975 MG: 325 TABLET ORAL at 05:50

## 2024-02-10 RX ADMIN — ENOXAPARIN SODIUM 40 MG: 40 INJECTION SUBCUTANEOUS at 17:51

## 2024-02-10 RX ADMIN — ACETAMINOPHEN 975 MG: 325 TABLET ORAL at 23:36

## 2024-02-10 RX ADMIN — IBUPROFEN 600 MG: 600 TABLET ORAL at 23:36

## 2024-02-10 ASSESSMENT — PAIN SCALES - PAIN ASSESSMENT IN ADVANCED DEMENTIA (PAINAD)
TOTALSCORE: 0
FACIALEXPRESSION: SMILING OR INEXPRESSIVE
CONSOLABILITY: NO NEED TO CONSOLE
BODYLANGUAGE: RELAXED
BREATHING: NORMAL

## 2024-02-10 ASSESSMENT — PAIN SCALES - GENERAL
PAINLEVEL_OUTOF10: 0 - NO PAIN
PAINLEVEL_OUTOF10: 2
PAINLEVEL_OUTOF10: 0 - NO PAIN

## 2024-02-10 NOTE — PROGRESS NOTES
Postpartum Progress Note    Assessment/Plan   Winnie Dos Santos is a 38 y.o., , who delivered at 39w6d gestation and is now postpartum day 1.    VSS  Pain controlled with PO meds  Talked about Abd binder, Pt will try it  Talked about recommendations for driving and lifting, Pt understands but might need to drive kids to school this week.    Principal Problem:    39 weeks gestation of pregnancy  Active Problems:    Multigravida of advanced maternal age in third trimester    History of shoulder dystocia in prior pregnancy    Iron deficiency anemia    Pregnancy Problems (from 21 to present)       Problem Noted Resolved    39 weeks gestation of pregnancy 2024 by Negin ACUNA MD No    Priority:  Medium      Multigravida of advanced maternal age in third trimester 10/10/2023 by Araceli Sandoval MD No    Priority:  Medium      Overview Signed 10/10/2023 12:19 PM by Araceli Sandoval MD     -serial growth  -NSTs at 36 wks  -IOL in 39th week         22 weeks gestation of pregnancy 10/10/2023 by Araceli Sandoval MD 2024 by Belkys White MD          Hospital course: no complications   section delivery  Patient is currently breastfeedingThe patient's blood type is O POS. The baby's blood type is O POS . Rhogam is not indicated.    Subjective   Her pain is well controlled with current medications  She is passing flatus  She is ambulating well  She is tolerating a Adult diet Regular  She reports no breast or nursing problems  She denies emotional concerns today   Her plan for contraception is none     Pt doing well, ambulating well, urinating without problems.  Bleeding is minimal, breastfeeding well.  No problems.    Objective   Allergies:   Patient has no known allergies.         Last Vitals:  Temp Pulse Resp BP MAP Pulse Ox   36.6 °C (97.9 °F) 81 16 115/74   98 %     Vitals Min/Max Last 24 Hours:  Temp  Min: 36.6 °C (97.9 °F)  Max: 37.2 °C (99 °F)  Pulse  Min: 73  Max: 92  Resp  Min: 15   Max: 16  BP  Min: 95/51  Max: 115/74    Intake/Output:     Intake/Output Summary (Last 24 hours) at 2/10/2024 0856  Last data filed at 2/10/2024 0022  Gross per 24 hour   Intake --   Output 700 ml   Net -700 ml       Physical Exam:  General: Examination reveals a well developed, well nourished, female, in no acute distress. She is alert and cooperative.  Incision: no drainage, dressing looks clean, dry and intact.  Fundus: firm and nontender.  Extremities: no redness or tenderness in the calves or thighs, no edema.  Neurological: DTRs normal and symmetrical.  Psychological: awake and alert; oriented to person, place, and time.    Lab Data:  Lab Results   Component Value Date    WBC 16.6 (H) 02/09/2024    HGB 9.9 (L) 02/09/2024    HCT 31.3 (L) 02/09/2024     02/09/2024

## 2024-02-10 NOTE — ANESTHESIA POSTPROCEDURE EVALUATION
Patient: Winnie Dos Santos    Procedure Summary       Date: 24 Room / Location: [facility]    Anesthesia Start: 152 Anesthesia Stop: 24    Procedure:  Section (Abdomen) Diagnosis: (Arrest of descent / materanl descion)    Surgeons: Armin Estrada MD Responsible Provider: BRENDAN Ramirez    Anesthesia Type: epidural ASA Status: 2            Anesthesia Type: epidural    Vitals Value Taken Time   BP 97/56 24   Temp 36.6 °C (97.9 °F) 24   Pulse 75 24   Resp 16 24   SpO2 97 % 24       Anesthesia Post Evaluation    Patient location during evaluation: bedside  Patient participation: complete - patient participated  Level of consciousness: awake and alert  Pain management: satisfactory to patient  Multimodal analgesia pain management approach  Airway patency: patent  Cardiovascular status: acceptable  Respiratory status: acceptable  Hydration status: acceptable  Postoperative Nausea and Vomiting: none  Comments: Epidural catheter removed by nursing. No redness, swelling, or drainage at puncture site.    Complete resolution of numbness. Patient is able to lift legs, bend at the knees, and ambulate.    Patient denies problems with urination.    Patient denies nausea, headache or severe back pain.         There were no known notable events for this encounter.

## 2024-02-10 NOTE — CARE PLAN
The patient's goals for the shift include stabilize blood pressure    The clinical goals for the shift include stable vs    Over the shift, the patient did not make progress toward the following goals. Barriers to progression include none. Recommendations to address these barriers include na.

## 2024-02-10 NOTE — CARE PLAN
The patient's goals for the shift include return to pre preg state    The clinical goals for the shift include stable vs    Over the shift, the patient did make progress toward the following goals. Barriers to progression include none. Recommendations to address these barriers include na.

## 2024-02-11 VITALS
SYSTOLIC BLOOD PRESSURE: 129 MMHG | TEMPERATURE: 98.6 F | BODY MASS INDEX: 31.86 KG/M2 | WEIGHT: 179.78 LBS | DIASTOLIC BLOOD PRESSURE: 83 MMHG | OXYGEN SATURATION: 98 % | HEIGHT: 63 IN | HEART RATE: 83 BPM | RESPIRATION RATE: 16 BRPM

## 2024-02-11 LAB
ALBUMIN SERPL BCP-MCNC: 2.6 G/DL (ref 3.4–5)
ALP SERPL-CCNC: 95 U/L (ref 33–110)
ALT SERPL W P-5'-P-CCNC: 11 U/L (ref 7–45)
ANION GAP SERPL CALC-SCNC: 9 MMOL/L (ref 10–20)
AST SERPL W P-5'-P-CCNC: 14 U/L (ref 9–39)
BILIRUB SERPL-MCNC: 0.1 MG/DL (ref 0–1.2)
BUN SERPL-MCNC: 9 MG/DL (ref 6–23)
CALCIUM SERPL-MCNC: 8.2 MG/DL (ref 8.6–10.3)
CHLORIDE SERPL-SCNC: 107 MMOL/L (ref 98–107)
CO2 SERPL-SCNC: 25 MMOL/L (ref 21–32)
CREAT SERPL-MCNC: 0.65 MG/DL (ref 0.5–1.05)
EGFRCR SERPLBLD CKD-EPI 2021: >90 ML/MIN/1.73M*2
ERYTHROCYTE [DISTWIDTH] IN BLOOD BY AUTOMATED COUNT: 17.2 % (ref 11.5–14.5)
GLUCOSE SERPL-MCNC: 85 MG/DL (ref 74–99)
HCT VFR BLD AUTO: 29.3 % (ref 36–46)
HGB BLD-MCNC: 9.2 G/DL (ref 12–16)
MCH RBC QN AUTO: 27.3 PG (ref 26–34)
MCHC RBC AUTO-ENTMCNC: 31.4 G/DL (ref 32–36)
MCV RBC AUTO: 87 FL (ref 80–100)
NRBC BLD-RTO: 0 /100 WBCS (ref 0–0)
PLATELET # BLD AUTO: 188 X10*3/UL (ref 150–450)
POTASSIUM SERPL-SCNC: 4.1 MMOL/L (ref 3.5–5.3)
PROT SERPL-MCNC: 5.7 G/DL (ref 6.4–8.2)
RBC # BLD AUTO: 3.37 X10*6/UL (ref 4–5.2)
SODIUM SERPL-SCNC: 137 MMOL/L (ref 136–145)
WBC # BLD AUTO: 9.7 X10*3/UL (ref 4.4–11.3)

## 2024-02-11 PROCEDURE — 2500000001 HC RX 250 WO HCPCS SELF ADMINISTERED DRUGS (ALT 637 FOR MEDICARE OP): Performed by: STUDENT IN AN ORGANIZED HEALTH CARE EDUCATION/TRAINING PROGRAM

## 2024-02-11 PROCEDURE — 85027 COMPLETE CBC AUTOMATED: CPT | Performed by: STUDENT IN AN ORGANIZED HEALTH CARE EDUCATION/TRAINING PROGRAM

## 2024-02-11 PROCEDURE — 80053 COMPREHEN METABOLIC PANEL: CPT | Performed by: STUDENT IN AN ORGANIZED HEALTH CARE EDUCATION/TRAINING PROGRAM

## 2024-02-11 PROCEDURE — 2500000001 HC RX 250 WO HCPCS SELF ADMINISTERED DRUGS (ALT 637 FOR MEDICARE OP): Performed by: OBSTETRICS & GYNECOLOGY

## 2024-02-11 PROCEDURE — 2500000004 HC RX 250 GENERAL PHARMACY W/ HCPCS (ALT 636 FOR OP/ED): Performed by: OBSTETRICS & GYNECOLOGY

## 2024-02-11 PROCEDURE — 36415 COLL VENOUS BLD VENIPUNCTURE: CPT | Performed by: STUDENT IN AN ORGANIZED HEALTH CARE EDUCATION/TRAINING PROGRAM

## 2024-02-11 RX ORDER — ACETAMINOPHEN 500 MG
1000 TABLET ORAL EVERY 6 HOURS PRN
COMMUNITY
Start: 2024-02-11

## 2024-02-11 RX ORDER — IBUPROFEN 600 MG/1
600 TABLET ORAL EVERY 6 HOURS PRN
COMMUNITY
Start: 2024-02-11

## 2024-02-11 RX ORDER — ACETAMINOPHEN 500 MG
1000 TABLET ORAL EVERY 6 HOURS PRN
Status: CANCELLED | COMMUNITY
Start: 2024-02-11

## 2024-02-11 RX ORDER — NEBULIZER AND COMPRESSOR
1 EACH MISCELLANEOUS 2 TIMES DAILY
Qty: 1 EACH | Refills: 0 | Status: CANCELLED | OUTPATIENT
Start: 2024-02-11

## 2024-02-11 RX ORDER — FERROUS SULFATE 325(65) MG
65 TABLET ORAL
Status: DISCONTINUED | OUTPATIENT
Start: 2024-02-11 | End: 2024-02-11 | Stop reason: HOSPADM

## 2024-02-11 RX ORDER — IBUPROFEN 600 MG/1
600 TABLET ORAL EVERY 6 HOURS PRN
Status: CANCELLED | COMMUNITY
Start: 2024-02-11

## 2024-02-11 RX ORDER — FERROUS SULFATE 325(65) MG
65 TABLET ORAL
Status: DISCONTINUED | OUTPATIENT
Start: 2024-02-12 | End: 2024-02-11

## 2024-02-11 RX ORDER — POLYETHYLENE GLYCOL 3350 17 G/17G
17 POWDER, FOR SOLUTION ORAL DAILY
Status: CANCELLED | COMMUNITY
Start: 2024-02-11

## 2024-02-11 RX ORDER — POLYETHYLENE GLYCOL 3350 17 G/17G
17 POWDER, FOR SOLUTION ORAL DAILY
COMMUNITY
Start: 2024-02-11

## 2024-02-11 RX ORDER — FERROUS SULFATE 325(65) MG
65 TABLET ORAL
Status: DISCONTINUED | OUTPATIENT
Start: 2024-02-11 | End: 2024-02-11

## 2024-02-11 RX ORDER — NEBULIZER AND COMPRESSOR
1 EACH MISCELLANEOUS 2 TIMES DAILY
Qty: 1 EACH | Refills: 0 | Status: SHIPPED | OUTPATIENT
Start: 2024-02-11

## 2024-02-11 RX ADMIN — ENOXAPARIN SODIUM 40 MG: 40 INJECTION SUBCUTANEOUS at 11:33

## 2024-02-11 RX ADMIN — ACETAMINOPHEN 975 MG: 325 TABLET ORAL at 11:33

## 2024-02-11 RX ADMIN — FERROUS SULFATE TAB 325 MG (65 MG ELEMENTAL FE) 1 TABLET: 325 (65 FE) TAB at 11:33

## 2024-02-11 RX ADMIN — ACETAMINOPHEN 975 MG: 325 TABLET ORAL at 06:06

## 2024-02-11 RX ADMIN — IBUPROFEN 600 MG: 600 TABLET ORAL at 11:33

## 2024-02-11 RX ADMIN — IBUPROFEN 600 MG: 600 TABLET ORAL at 06:06

## 2024-02-11 ASSESSMENT — PAIN SCALES - GENERAL
PAINLEVEL_OUTOF10: 0 - NO PAIN
PAINLEVEL_OUTOF10: 3
PAINLEVEL_OUTOF10: 2
PAINLEVEL_OUTOF10: 0 - NO PAIN

## 2024-02-11 ASSESSMENT — PAIN DESCRIPTION - DESCRIPTORS: DESCRIPTORS: CRAMPING

## 2024-02-11 NOTE — NURSING NOTE
Dr Torres into see pt regarding plan for the day   Will check vital signs after lab draw and baby feeding

## 2024-02-11 NOTE — PROGRESS NOTES
HELLP labs negative. BPs today normotensive. Patient requesting discharge home. Reviewed recommendation for 72 hour stay for BP monitoring. Discussed risks of discharge PPD#2 including readmission, elevated BPs at home, seizure, stroke, and MI. Patient expressed understanding of these risks and desires discharge. Will Rx BP cuff. Discussed checking BP twice daily and reviewed parameters to call office. Will plan for BP check and dressing removal in the office in 3-5 days. All questions answered    Yolis Hidalgo MD

## 2024-02-11 NOTE — DISCHARGE SUMMARY
Discharge Summary    Admission Date: 2024  Discharge Date: 24     Discharge Diagnosis  39 weeks gestation of pregnancy    Hospital Course  Delivery Date: 2024  1:48 AM   Delivery type: , Low Transverse    GA at delivery: 39w6d  Outcome: Living   Anesthesia during delivery: Epidural   Intrapartum complications: Fetal Intolerance;Failure to Progress in Second Stage   Feeding method: Breastfeeding Status: Yes     Patient presented for IOL at term. On , she underwent pLTCS for persistent category 2 tracing during second stage remote from delivery. Surgery was uncomplicated, EBL 770cc. Hgb equilibrated to 9.2 and she was started on PO iron. She developed gHTN postpartum. HELLP labs negative and she remained asymptomatic. She did not require BP meds. She was recommended to stay for 72 hours for BP monitoring however requested discharge on POD#2. She expressed understanding of the risks of early discharge and will be prescribed a BP cuff to check her BPs at home. She is Rh positive and rubella immune. Will plan for BP check and Aquacel dressing removal in the office in 3-5 days.     Pertinent Physical Exam At Time of Discharge  See progress note from day of discharge    Discharge Meds     Your medication list        START taking these medications        Instructions Last Dose Given Next Dose Due   acetaminophen 500 mg tablet  Commonly known as: Tylenol Extra Strength      Take 2 tablets (1,000 mg) by mouth every 6 hours if needed for mild pain (1 - 3).       ibuprofen 600 mg tablet      Take 1 tablet (600 mg) by mouth every 6 hours if needed for mild pain (1 - 3).       polyethylene glycol 17 gram packet  Commonly known as: Glycolax, Miralax      Take 17 g by mouth once daily.              CONTINUE taking these medications        Instructions Last Dose Given Next Dose Due   IRON 100 PLUS ORAL           PRENATAL ORAL                     Where to Get Your Medications        You can get these  medications from any pharmacy    You don't need a prescription for these medications  acetaminophen 500 mg tablet  ibuprofen 600 mg tablet  polyethylene glycol 17 gram packet          Complications Requiring Follow-Up  gHTN    Test Results Pending At Discharge  Pending Labs       No current pending labs.            Outpatient Follow-Up  No future appointments.      Yolis Hidalgo MD

## 2024-02-11 NOTE — CARE PLAN
The patient's goals for the shift include ambulate in hallways    The clinical goals for the shift include vital signs wnl      Problem: Safety - Adult  Goal: Free from fall injury  Outcome: Progressing     Problem:  Recovery Care  Goal: Dressing intact until removed with any drainage marked  Outcome: Progressing     Problem: Postpartum  Goal: Incisions, wounds, or drain sites healing without S/S of infection  Outcome: Progressing

## 2024-02-11 NOTE — NURSING NOTE
.Verbal and written discharge instructions reviewed with mother, to include post birth warning signs, medication reconciliation, follow up appointments, and  care. Patient verbalized understanding of.   Discussed with patient BP monitoring at home. Patient assessed and meets criteria with diagnosis of Preeclampsia/maternal hypertension or any previous hypertension issues. Pt agreed to ordering home BP monitoring CUFF. Demo sheet and order completed. Home monitoring log reviewed with patient prior to discharge. Pt verbalizes understanding of importance in home BP monitoring.  BP monitor provided to patient at this time. Will continue to follow and support until discharge.

## 2024-02-11 NOTE — PROGRESS NOTES
Postpartum Progress Note    Assessment/Plan   Winnie Dos Santos is a 38 y.o., , who delivered at 39w6d gestation and is now postpartum day 2.    s/p CS  -Pain well controlled on current regimen, not requiring oxycodone  -Afebrile, ambulating, passing flatus  -Tolerating regular diet with anti-emetics PRN and bowel regimen ordered  -Voiding spontaneously  -Admission hgb 11.4 --> EBL 770cc --> -> 4hr postop hgb 9.9 -> POD#2 hgb pending; PO vs IV iron pending labs this AM. Continue to monitor for si/sx of anemia.   -Rh positive  -Rubella immune  -Lactation consultant PRN    gHTN  -Diagnosed by mild range BP during labor and again PPD#2  -Asymptomatic  -HELLP labs pending  -No meds  -Reviewed recommendation for 72 hour stay for BP monitoring. Patient may desire discharge home today. Will follow-up labs and BP trend today and reassess this afternoon    DVT prophylaxis  -VTE risk score = 5, for PPx lovenox  -SCDs, ambulation    Yolis Hidalgo MD     Principal Problem:    39 weeks gestation of pregnancy  Active Problems:    Multigravida of advanced maternal age in third trimester    History of shoulder dystocia in prior pregnancy    Iron deficiency anemia    Pregnancy Problems (from 21 to present)       Problem Noted Resolved    39 weeks gestation of pregnancy 2024 by Negin ACUNA MD No    Priority:  Medium      Multigravida of advanced maternal age in third trimester 10/10/2023 by Araceli Sandoval MD No    Priority:  Medium      Overview Signed 10/10/2023 12:19 PM by Araceli Sandoval MD     -serial growth  -NSTs at 36 wks  -IOL in 39th week         22 weeks gestation of pregnancy 10/10/2023 by Araceli Sandoval MD 2024 by Belkys White MD          Hospital course: gestational hypertension   section delivery  Patient is currently breastfeedingThe patient's blood type is O POS. The baby's blood type is O POS . Rhogam is not indicated.    Subjective   Was having some pain yesterday,  improved today, not requiring narcotics. Tolerating PO. Ambulating, voiding, passing flatus. Awaiting BM. Lochia light. Breast feeding. Denies HA, scotoma, CP, SOB, or RUQ pain. Does have some LE edema    Objective   Allergies:   Patient has no known allergies.         Last Vitals:  Temp Pulse Resp BP MAP Pulse Ox   36.7 °C (98.1 °F) 87 16 (!) 147/79   98 %     Vitals Min/Max Last 24 Hours:  Temp  Min: 36.6 °C (97.9 °F)  Max: 37 °C (98.6 °F)  Pulse  Min: 82  Max: 91  Resp  Min: 16  Max: 16  BP  Min: 113/69  Max: 147/79    Intake/Output:   No intake or output data in the 24 hours ending 02/11/24 0852    Physical Exam:  Gen: awake, alert, steady gait  Head: NCAT  HEENT: moist mucus membranes  Pulm: breathing comfortably on room air  CV: warm and well-perfused  Abd: soft, appropriately tender to palpation, Aquacel dressing in place without shadowing  Neuro: alert and oriented  Psych: appropriate affect   MSK: 1+ pitting edema in bilateral LEs without calf tenderness    Lab Data:  Labs in chart were reviewed.

## 2024-02-11 NOTE — CARE PLAN
The patient's goals for the shift include ambulate in hallways    The clinical goals for the shift include vital signs wnl    Over the shift, the patient met the goals

## 2024-02-11 NOTE — NURSING NOTE
.Patient is being discharged in stable condition, infant strapped in car seat, HUGS tag removed skin intact and ID bands verified. Infant is being carried by mother on lap in wheelchair. No further questions at this time. Phone numbers provided to patient and when all future appointments need to be made. Patient has all belongings and discharge folder.

## 2024-02-12 LAB
BLOOD EXPIRATION DATE: NORMAL
DISPENSE STATUS: NORMAL
PRODUCT BLOOD TYPE: 5100
PRODUCT CODE: NORMAL
UNIT ABO: NORMAL
UNIT NUMBER: NORMAL
UNIT RH: NORMAL
UNIT VOLUME: 350
XM INTEP: NORMAL

## 2024-02-13 ENCOUNTER — TELEPHONE (OUTPATIENT)
Dept: OBSTETRICS AND GYNECOLOGY | Facility: CLINIC | Age: 39
End: 2024-02-13
Payer: COMMERCIAL

## 2024-02-13 NOTE — TELEPHONE ENCOUNTER
----- Message from Yolis Hidalgo MD sent at 2/11/2024  2:03 PM EST -----  Regarding: BP check/incision check  This is a patient of Dr. Sandoval's who will need a BP check and dressing removal in the office 2/15 or 2/16 this week. Can you help arrange?

## 2024-02-16 ENCOUNTER — POSTPARTUM VISIT (OUTPATIENT)
Dept: OBSTETRICS AND GYNECOLOGY | Facility: CLINIC | Age: 39
End: 2024-02-16
Payer: COMMERCIAL

## 2024-02-16 VITALS — DIASTOLIC BLOOD PRESSURE: 98 MMHG | WEIGHT: 174 LBS | BODY MASS INDEX: 30.75 KG/M2 | SYSTOLIC BLOOD PRESSURE: 145 MMHG

## 2024-02-16 DIAGNOSIS — Z09 POSTOP CHECK: ICD-10-CM

## 2024-02-16 PROCEDURE — 99214 OFFICE O/P EST MOD 30 MIN: CPT | Performed by: STUDENT IN AN ORGANIZED HEALTH CARE EDUCATION/TRAINING PROGRAM

## 2024-02-16 ASSESSMENT — EDINBURGH POSTNATAL DEPRESSION SCALE (EPDS)
I HAVE BEEN SO UNHAPPY THAT I HAVE BEEN CRYING: ONLY OCCASIONALLY
I HAVE FELT SCARED OR PANICKY FOR NO GOOD REASON: NO, NOT MUCH
TOTAL SCORE: 9
I HAVE BLAMED MYSELF UNNECESSARILY WHEN THINGS WENT WRONG: YES, SOME OF THE TIME
THE THOUGHT OF HARMING MYSELF HAS OCCURRED TO ME: NEVER
I HAVE FELT SAD OR MISERABLE: NOT VERY OFTEN
THINGS HAVE BEEN GETTING ON TOP OF ME: YES, SOMETIMES I HAVEN'T BEEN COPING AS WELL AS USUAL
I HAVE BEEN ANXIOUS OR WORRIED FOR NO GOOD REASON: YES, SOMETIMES
I HAVE BEEN ABLE TO LAUGH AND SEE THE FUNNY SIDE OF THINGS: AS MUCH AS I ALWAYS COULD
I HAVE LOOKED FORWARD WITH ENJOYMENT TO THINGS: AS MUCH AS I EVER DID
I HAVE BEEN SO UNHAPPY THAT I HAVE HAD DIFFICULTY SLEEPING: NOT AT ALL

## 2024-02-16 NOTE — PROGRESS NOTES
Subjective:    Winnie Dos Santos is a 38 y.o.  now POD#7 from pLTCS presenting for post-operative incision check and BP check. On , patient underwent pLTCS for persistent category 2 tracing and arrest of descent without complication, EBL 770cc. Hgb equilibrated to 9.2 and she was started on PO iron. She developed gHTN postpartum not requiring BP meds. She requested discharge on PPD#2.     She is doing well at home. She denies wound discharge, bleeding, redness, or swelling. She is taking tylenol and motrin for pain. She denies fevers or chills. She is eating, drinking, voiding, and having bowel movements. She is feeding and baby is doing well. BPs at home 120s-130s/80s-90s. She denies HA, scotoma, CP, SOB, or RUQ pain.     Objective:  Vitals:    24 1207   BP: (!) 145/98      Gen: NAD  Abd: soft, NTND  Skin: Aquacel dressing removed. Skin clean, dry, and intact; no erythema, discharge, or bleeding. 3x3cm area of ecchymosis just inferior to left lateral corner of incision without palpable hematoma  Neuro: alert and oriented  Psych: appropriate affect    Assessment and Plan:    Winnie Dos Santos is a  now POD#7 from pLTCS presenting for post-operative incision check and BP checkpost-operative wound check, doing well.    gHTN  -No meds  -BPs at home normotensive to low mild range  -BP today 145/98  -No indication for meds at this time. To continue to monitor and call if 150s/100s persistently    Postop  -Dressing removed today  -Incision well-approximated, healing appropriately without si/sx of infection  -Continue pelvic rest and lifting restrictions    Postpartum  -Mood: EPDS 9, overall stable  -Feeding: breast  -Contraception: condoms and lactational amenorrhea,  considering vasectomy    Dispo: Return to clinic in 4 weeks for postpartum visit    Yolis Hidalgo MD

## 2024-03-15 ENCOUNTER — POSTPARTUM VISIT (OUTPATIENT)
Dept: OBSTETRICS AND GYNECOLOGY | Facility: CLINIC | Age: 39
End: 2024-03-15
Payer: COMMERCIAL

## 2024-03-15 VITALS
SYSTOLIC BLOOD PRESSURE: 120 MMHG | HEIGHT: 63 IN | DIASTOLIC BLOOD PRESSURE: 78 MMHG | WEIGHT: 163.13 LBS | BODY MASS INDEX: 28.9 KG/M2

## 2024-03-15 DIAGNOSIS — Z12.4 CERVICAL CANCER SCREENING: Primary | ICD-10-CM

## 2024-03-15 DIAGNOSIS — Z12.31 VISIT FOR SCREENING MAMMOGRAM: ICD-10-CM

## 2024-03-15 DIAGNOSIS — Z76.89 ENCOUNTER TO ESTABLISH CARE: ICD-10-CM

## 2024-03-15 PROCEDURE — 87624 HPV HI-RISK TYP POOLED RSLT: CPT

## 2024-03-15 PROCEDURE — 0503F POSTPARTUM CARE VISIT: CPT | Performed by: STUDENT IN AN ORGANIZED HEALTH CARE EDUCATION/TRAINING PROGRAM

## 2024-03-15 PROCEDURE — 88175 CYTOPATH C/V AUTO FLUID REDO: CPT

## 2024-03-15 ASSESSMENT — EDINBURGH POSTNATAL DEPRESSION SCALE (EPDS)
I HAVE BLAMED MYSELF UNNECESSARILY WHEN THINGS WENT WRONG: YES, SOME OF THE TIME
I HAVE FELT SCARED OR PANICKY FOR NO GOOD REASON: NO, NOT MUCH
THE THOUGHT OF HARMING MYSELF HAS OCCURRED TO ME: NEVER
I HAVE LOOKED FORWARD WITH ENJOYMENT TO THINGS: RATHER LESS THAN I USED TO
TOTAL SCORE: 12
I HAVE BEEN SO UNHAPPY THAT I HAVE HAD DIFFICULTY SLEEPING: NOT VERY OFTEN
THINGS HAVE BEEN GETTING ON TOP OF ME: YES, SOMETIMES I HAVEN'T BEEN COPING AS WELL AS USUAL
I HAVE FELT SAD OR MISERABLE: YES, QUITE OFTEN
I HAVE BEEN ABLE TO LAUGH AND SEE THE FUNNY SIDE OF THINGS: AS MUCH AS I ALWAYS COULD
I HAVE BEEN ANXIOUS OR WORRIED FOR NO GOOD REASON: YES, SOMETIMES
I HAVE BEEN SO UNHAPPY THAT I HAVE BEEN CRYING: ONLY OCCASIONALLY

## 2024-03-15 NOTE — PROGRESS NOTES
Subjective:    Winnie Dos Santos is a female 38 y.o. year old  who is 6 weeks s/p pLTCS for persistent category 2 tracing and arrest of descent presenting for postpartum visit. Her pregnancy and delivery were complicated by gHTN.    Overall, she feels well. She is able to perform daily tasks without difficulty. She denies difficulties with bowel or bladder function. Her other children have not been sleeping and she is exhausted.  not helping as much. Has asked her mom to help. Mood okay, better than prior pregnancies. Has recently started therapy through her employer. She is currently breast feeding. Lochia ceased. She has not had a period since that time. She has not resumed intercourse and plans lactational amenorrhea and condoms for contraception. BPs at home normotensive.     Objective:  Vitals:    03/15/24 1106   BP: 120/78     Gen: awake, alert  Head: NCAT  HEENT: moist mucus membranes  Pulm: breathing comfortably on room air  CV: warm and well-perfused  Abd: pfannenstiel incision well-healed without surrounding erythema or warmth  : normal external genitalia, no blood or discharge in vault, cervix without lesion, pap collected   Neuro: alert and oriented  Psych: appropriate affect     Assessment and Plan:    Winnie Dos Santos is a female 38 y.o. year old  who is 6 weeks s/p pLTCS presenting for postpartum visit    Postpartum  -Postpartum care: may resume normal activities including exercise, work, intercourse  -Contraception: condoms and lactational amenorrhea,  considering vasectomy   -Feeding: breast  -Mood: EPDS 12, counseled on PPD, in therapy, declines further management at this time. Encouraged to continue asking for help with childcare as needed    gHTN  -No meds  -BPs at home normotensive  -BP today 120/78  -Now resolved    Health Maintenance  -Pap: co-test collected today   -Mammogram: ordered   -Referred to PCP for health maintenance    Return to clinic for annual examination  or sooner as needed    Yolis Hidalgo MD

## 2024-03-28 LAB
CYTOLOGY CMNT CVX/VAG CYTO-IMP: NORMAL
HPV HR 12 DNA GENITAL QL NAA+PROBE: NEGATIVE
HPV HR GENOTYPES PNL CVX NAA+PROBE: NEGATIVE
HPV16 DNA SPEC QL NAA+PROBE: NEGATIVE
HPV18 DNA SPEC QL NAA+PROBE: NEGATIVE
LAB AP HPV GENOTYPE QUESTION: YES
LAB AP HPV HR: NORMAL
LABORATORY COMMENT REPORT: NORMAL
MENSTRUAL HX REPORTED: NORMAL
PATH REPORT.TOTAL CANCER: NORMAL

## (undated) DEVICE — SLEEVE, SCD EXPRESS, KNEE LENGTH-MEDIUM

## (undated) DEVICE — TUBING, SUCTION, CONNECTING, 9/32 X 10FT, LF

## (undated) DEVICE — SOLUTION, IRRIGATION, STERILE WATER, 1000 ML, POUR BOTTLE

## (undated) DEVICE — SOLUTION, IRRIGATION, SODIUM CHLORIDE 0.9%, 1000 ML, POUR BOTTLE

## (undated) DEVICE — GLOVE, SURGICAL, PROTEXIS PI , 6.5, PF, LF

## (undated) DEVICE — Device

## (undated) DEVICE — APPLICATOR, CHLORAPREP, W/ORANGE TINT, 26ML

## (undated) DEVICE — SUTURE, VICRYL, 0, 36 IN, CT-1, UNDYED

## (undated) DEVICE — CAP, BABY, 4 X 6 IN, PINK/BLUE/WHITE

## (undated) DEVICE — SUTURE, VICRYL, 2-0, 27 IN, CT-1, VIOLET

## (undated) DEVICE — PREP TRAY, VAGINAL

## (undated) DEVICE — SUTURE, MONOCRYL, 0, 36 IN, CT, VIOLET

## (undated) DEVICE — TRAY, SURESTEP, SILICONE DRAINAGE BAG, STATLOCK, 16FR

## (undated) DEVICE — SUTURE, VICRYL, 4-0, 18 IN, UNDYED BR PS-2

## (undated) DEVICE — GLOVE, SURGICAL, PROTEXIS PI , 7.5, PF, LF

## (undated) DEVICE — ADHESIVE, SKIN, DERMABOND ADVANCED, 15CM, PEN-STYLE

## (undated) DEVICE — TRAY, SPINAL, PENCAN 25 GA X 3.5"

## (undated) DEVICE — COVER HANDLE LIGHT, STERIS, BLUE, STERILE

## (undated) DEVICE — SUTURE, VICRYL, 3-0, 27 IN, CT-1, UNDYED